# Patient Record
Sex: FEMALE | Race: OTHER | URBAN - METROPOLITAN AREA
[De-identification: names, ages, dates, MRNs, and addresses within clinical notes are randomized per-mention and may not be internally consistent; named-entity substitution may affect disease eponyms.]

---

## 2021-09-26 ENCOUNTER — INPATIENT (INPATIENT)
Facility: HOSPITAL | Age: 31
LOS: 1 days | Discharge: HOME | DRG: 103 | End: 2021-09-28
Attending: INTERNAL MEDICINE | Admitting: INTERNAL MEDICINE
Payer: MEDICAID

## 2021-09-26 VITALS
HEIGHT: 60.24 IN | HEART RATE: 75 BPM | TEMPERATURE: 98 F | WEIGHT: 130.95 LBS | SYSTOLIC BLOOD PRESSURE: 128 MMHG | DIASTOLIC BLOOD PRESSURE: 73 MMHG | OXYGEN SATURATION: 98 % | RESPIRATION RATE: 16 BRPM

## 2021-09-26 DIAGNOSIS — R47.1 DYSARTHRIA AND ANARTHRIA: ICD-10-CM

## 2021-09-26 LAB
ALBUMIN SERPL ELPH-MCNC: 4.3 G/DL — SIGNIFICANT CHANGE UP (ref 3.5–5)
ALP SERPL-CCNC: 61 U/L — SIGNIFICANT CHANGE UP (ref 40–120)
ALT FLD-CCNC: 50 U/L DA — SIGNIFICANT CHANGE UP (ref 10–60)
ANION GAP SERPL CALC-SCNC: 4 MMOL/L — LOW (ref 5–17)
APPEARANCE UR: CLEAR — SIGNIFICANT CHANGE UP
AST SERPL-CCNC: 23 U/L — SIGNIFICANT CHANGE UP (ref 10–40)
BACTERIA # UR AUTO: ABNORMAL /HPF
BASOPHILS # BLD AUTO: 0.04 K/UL — SIGNIFICANT CHANGE UP (ref 0–0.2)
BASOPHILS NFR BLD AUTO: 0.5 % — SIGNIFICANT CHANGE UP (ref 0–2)
BILIRUB SERPL-MCNC: 0.7 MG/DL — SIGNIFICANT CHANGE UP (ref 0.2–1.2)
BILIRUB UR-MCNC: NEGATIVE — SIGNIFICANT CHANGE UP
BUN SERPL-MCNC: 9 MG/DL — SIGNIFICANT CHANGE UP (ref 7–18)
CALCIUM SERPL-MCNC: 9 MG/DL — SIGNIFICANT CHANGE UP (ref 8.4–10.5)
CHLORIDE SERPL-SCNC: 110 MMOL/L — HIGH (ref 96–108)
CK SERPL-CCNC: 117 U/L — SIGNIFICANT CHANGE UP (ref 21–215)
CO2 SERPL-SCNC: 28 MMOL/L — SIGNIFICANT CHANGE UP (ref 22–31)
COLOR SPEC: YELLOW — SIGNIFICANT CHANGE UP
CREAT SERPL-MCNC: 0.63 MG/DL — SIGNIFICANT CHANGE UP (ref 0.5–1.3)
DIFF PNL FLD: NEGATIVE — SIGNIFICANT CHANGE UP
EOSINOPHIL # BLD AUTO: 0.13 K/UL — SIGNIFICANT CHANGE UP (ref 0–0.5)
EOSINOPHIL NFR BLD AUTO: 1.5 % — SIGNIFICANT CHANGE UP (ref 0–6)
EPI CELLS # UR: SIGNIFICANT CHANGE UP /HPF
ERYTHROCYTE [SEDIMENTATION RATE] IN BLOOD: 13 MM/HR — SIGNIFICANT CHANGE UP (ref 0–15)
GLUCOSE SERPL-MCNC: 84 MG/DL — SIGNIFICANT CHANGE UP (ref 70–99)
GLUCOSE UR QL: NEGATIVE — SIGNIFICANT CHANGE UP
HCG UR QL: NEGATIVE — SIGNIFICANT CHANGE UP
HCT VFR BLD CALC: 39.9 % — SIGNIFICANT CHANGE UP (ref 34.5–45)
HGB BLD-MCNC: 13.4 G/DL — SIGNIFICANT CHANGE UP (ref 11.5–15.5)
IMM GRANULOCYTES NFR BLD AUTO: 0.5 % — SIGNIFICANT CHANGE UP (ref 0–1.5)
KETONES UR-MCNC: NEGATIVE — SIGNIFICANT CHANGE UP
LEUKOCYTE ESTERASE UR-ACNC: NEGATIVE — SIGNIFICANT CHANGE UP
LYMPHOCYTES # BLD AUTO: 1.64 K/UL — SIGNIFICANT CHANGE UP (ref 1–3.3)
LYMPHOCYTES # BLD AUTO: 19 % — SIGNIFICANT CHANGE UP (ref 13–44)
MCHC RBC-ENTMCNC: 30.9 PG — SIGNIFICANT CHANGE UP (ref 27–34)
MCHC RBC-ENTMCNC: 33.6 GM/DL — SIGNIFICANT CHANGE UP (ref 32–36)
MCV RBC AUTO: 91.9 FL — SIGNIFICANT CHANGE UP (ref 80–100)
MONOCYTES # BLD AUTO: 0.36 K/UL — SIGNIFICANT CHANGE UP (ref 0–0.9)
MONOCYTES NFR BLD AUTO: 4.2 % — SIGNIFICANT CHANGE UP (ref 2–14)
NEUTROPHILS # BLD AUTO: 6.42 K/UL — SIGNIFICANT CHANGE UP (ref 1.8–7.4)
NEUTROPHILS NFR BLD AUTO: 74.3 % — SIGNIFICANT CHANGE UP (ref 43–77)
NITRITE UR-MCNC: NEGATIVE — SIGNIFICANT CHANGE UP
NRBC # BLD: 0 /100 WBCS — SIGNIFICANT CHANGE UP (ref 0–0)
PH UR: 6.5 — SIGNIFICANT CHANGE UP (ref 5–8)
PLATELET # BLD AUTO: 346 K/UL — SIGNIFICANT CHANGE UP (ref 150–400)
POTASSIUM SERPL-MCNC: 4 MMOL/L — SIGNIFICANT CHANGE UP (ref 3.5–5.3)
POTASSIUM SERPL-SCNC: 4 MMOL/L — SIGNIFICANT CHANGE UP (ref 3.5–5.3)
PROT SERPL-MCNC: 8.1 G/DL — SIGNIFICANT CHANGE UP (ref 6–8.3)
PROT UR-MCNC: NEGATIVE — SIGNIFICANT CHANGE UP
RBC # BLD: 4.34 M/UL — SIGNIFICANT CHANGE UP (ref 3.8–5.2)
RBC # FLD: 12.6 % — SIGNIFICANT CHANGE UP (ref 10.3–14.5)
RBC CASTS # UR COMP ASSIST: SIGNIFICANT CHANGE UP /HPF (ref 0–2)
SODIUM SERPL-SCNC: 142 MMOL/L — SIGNIFICANT CHANGE UP (ref 135–145)
SP GR SPEC: 1.01 — SIGNIFICANT CHANGE UP (ref 1.01–1.02)
UROBILINOGEN FLD QL: NEGATIVE — SIGNIFICANT CHANGE UP
WBC # BLD: 8.63 K/UL — SIGNIFICANT CHANGE UP (ref 3.8–10.5)
WBC # FLD AUTO: 8.63 K/UL — SIGNIFICANT CHANGE UP (ref 3.8–10.5)
WBC UR QL: SIGNIFICANT CHANGE UP /HPF (ref 0–5)

## 2021-09-26 PROCEDURE — 99222 1ST HOSP IP/OBS MODERATE 55: CPT

## 2021-09-26 PROCEDURE — 99285 EMERGENCY DEPT VISIT HI MDM: CPT

## 2021-09-26 PROCEDURE — 70470 CT HEAD/BRAIN W/O & W/DYE: CPT | Mod: 26,MA

## 2021-09-26 PROCEDURE — 71045 X-RAY EXAM CHEST 1 VIEW: CPT | Mod: 26

## 2021-09-26 RX ORDER — METOCLOPRAMIDE HCL 10 MG
10 TABLET ORAL ONCE
Refills: 0 | Status: COMPLETED | OUTPATIENT
Start: 2021-09-26 | End: 2021-09-26

## 2021-09-26 RX ADMIN — Medication 10 MILLIGRAM(S): at 18:05

## 2021-09-26 NOTE — ED PROVIDER NOTE - ATTENDING CONTRIBUTION TO CARE
pt with intermittent episodes of dysrthria , difficulty swallowing   headache improvement with reglan    pt is unable to speak clearly    possible migraine complex

## 2021-09-26 NOTE — H&P ADULT - HISTORY OF PRESENT ILLNESS
Patient is 31 year old female from home, no significant Past history presented due to severe headache. For the last 2 years she has been getting intermittent headaches to the top of the head that are associated with difficulty with speech and swallowing. Feels like foods is getting stuck and when eats with water it is better. The episodes last for ~ 1 month and then resolve on their own. Associated with slight numbness of the tongue. she had a workup in [place] and was told there is an "issue with her brain but nothing can be done". Denies any vision changes, weakness of the eyelids, worsening of symptoms towards the end of the day, fever, chills, photo/phonophobia, focal weakness, numbness/tingling anywhere else, incontinence, gait problem or any other complaints.

## 2021-09-26 NOTE — H&P ADULT - ASSESSMENT
Patient is 31 year old female from home, no significant Past history presented due to severe headache. For the last 2 years she has been getting intermittent headaches to the top of the head that are associated with difficulty with speech and swallowing. Patient will be admitted for further workup.

## 2021-09-26 NOTE — ED PROVIDER NOTE - PHYSICAL EXAMINATION
Face symmetrical during smiling although when talking seems to have a R sided drop. Able to puff cheeks and hold against pressure. +Dysarthria. Raises eyebrows symmetrical. No sensory deficits. Moves tongue in all directions with ease.  No pronator drift. All limbs equally strong bilaterally. No facial/scalp rash. no temporal tenderness to palpation.  Pupils 5 mm with PERRL and EOMI.  Uvula rises symmetrically. No uvular or tonsillar swelling/erythema or exudate.

## 2021-09-26 NOTE — ED ADULT NURSE NOTE - OBJECTIVE STATEMENT
As per pt, c/o intermittent h/a at the top of the head w/ difficulty w/ slight numbness of the tongue and speech and swallowing and pt admits she feels like foods is getting stuck in her throat x1 day. Pt admit the compliant happens for a month then resolves on its own over time. Pt denies all other symptoms.

## 2021-09-26 NOTE — ED PROVIDER NOTE - OBJECTIVE STATEMENT
31 year-old female, no significant PMHx, presents with Translated from [place] by Dr Vergara: 31 year-old female, no significant PMHx, presents with multiple medical complaints. For the last 2 years she has been getting intermittent headaches to the top of the head that are associated with difficulty with speech and swallowing. Feels like foods is getting stuck and when eats with water it is better. The episodes last for ~ 1 month and then resolve on their own. Associated with slight numbness of the tongue. Had a workup in [place] and was told there is an "issue with her brain but nothing can be done". Denies any vision changes, weakness of the eyelids, worsening of symptoms towards the end of the day, fever, chills, photo/phonophobia, focal weakness, numbness/tingling anywhere else, incontinence, gait problem or any other complaints. This episode started yesterday.

## 2021-09-26 NOTE — H&P ADULT - ATTENDING COMMENTS
Patient is a 31 year old female with no reported PMH who presented with a chief complaint of headache.  ( ID: [id number])  Patient states that beginning approximately 2 years prior to current presentation, she began to experience intermittent bi-frontal headache radiating to the calvarium.  Patient states that she awoke 3 days ago with a "bad" headache associated with difficulty "saying my words" as well as difficulty swallowing, predominantly solid foods (such as meat).  Patient endorses that these constellation of symptoms last for approximately 1 month and spontaneously resolves independent of any intervention.    Of note, patient states that approximately 1 year prior to current presentation, she was seen by a Neurologist in [place] and after testing, was informed that although she has a problem with her brain she was never provided a diagnosis and was told to ignore it.    Additionally of note, during initial triage in ED, both ED Attending (Dr. Vergara) and PA (Missael Mccullough) separately concured that patient's speech subjectively demonstrated a modicum of improvement since initial interview.    At time of examination in the ED, patient denies any dizziness, sensory changes, visual changes/diplopia, amulatory dysfunction, "electric"-like sensations, urinary dysfunction, pain/weakness/fatigue.     T(C): 36.6 (09-26-21 @ 19:36), Max: 36.9 (09-26-21 @ 15:17)  T(F): 97.8 (09-26-21 @ 19:36), Max: 98.4 (09-26-21 @ 15:17)  HR: 61 (09-26-21 @ 19:36) (61 - 75)  BP: 108/67 (09-26-21 @ 19:36) (108/67 - 128/73)  RR: 16 (09-26-21 @ 19:36) (16 - 16)  SpO2: 100% (09-26-21 @ 19:36) (98% - 100%)  Wt(kg): --    P/E: As above MAR    A/P:    Headache, Dysphasia and Dysphagia possibly due to Status Migrainosis/Complex Migraine vs Relapsing Remitting MS vs unlikely MG:  -CT Head identified an unremarkable pre and post contrast head CT  -[place] order CXR NOW  -Will send acetylcholine receptor autoantibodies, muscle-specific tyrosine kinase autoantibodies, low-density lipoprotein receptor-related protein 4 autoantibodies, GERDA, dsDNA  -Will send Hemoglobin A1c and Lipid Panel  -Will initiate patient on IVF  -Will order Toradol 30mg Once  -Will order Reglan 10mg IV Q8H JEAN MARIE  -Will need to obtain MRI Brain  -Will certainly need to ask Neurology to evaluate patient for further recommendations  -Fall Precautions, Aspiration Precautions    GI/DVT PPx:  -Intermittent Compression Stockings  -Pepcid Patient is a 31 year old female with no reported PMH who presented with a chief complaint of headache.  ( ID: [id number])  Patient states that beginning approximately 2 years prior to current presentation, she began to experience intermittent bi-frontal headache radiating to the calvarium.  Patient states that she awoke 3 days ago with a "bad" headache associated with difficulty "saying my words" as well as difficulty swallowing, predominantly solid foods (such as meat).  Patient endorses that these constellation of symptoms last for approximately 1 month and spontaneously resolves independent of any intervention.    Of note, patient states that approximately 1 year prior to current presentation, she was seen by a Neurologist in [place] and after testing was informed that although she has a problem with her brain she was never provided a diagnosis and was told to ignore it.    Additionally of note, during initial triage in ED, both ED Attending (Dr. Vergara) and PA (Missael Mccullough) separately concurred that patient's speech subjectively demonstrated a modicum of improvement since initial interview.    At time of examination in the ED, patient denies any dizziness, sensory changes, visual changes/diplopia, ambulatory dysfunction, "electric"-like sensations, urinary dysfunction, pain/weakness/fatigue.     T(C): 36.6 (09-26-21 @ 19:36), Max: 36.9 (09-26-21 @ 15:17)  T(F): 97.8 (09-26-21 @ 19:36), Max: 98.4 (09-26-21 @ 15:17)  HR: 61 (09-26-21 @ 19:36) (61 - 75)  BP: 108/67 (09-26-21 @ 19:36) (108/67 - 128/73)  RR: 16 (09-26-21 @ 19:36) (16 - 16)  SpO2: 100% (09-26-21 @ 19:36) (98% - 100%)  Wt(kg): --    P/E: As above MAR    A/P:    Headache, Dysphasia and Dysphagia possibly due to Status Migrainosis/Complex Migraine vs Relapsing Remitting MS vs unlikely MG:  -CT Head identified an unremarkable pre and post contrast head CT  -[place] order CXR NOW  -Will send acetylcholine receptor autoantibodies, muscle-specific tyrosine kinase autoantibodies, low-density lipoprotein receptor-related protein 4 autoantibodies, GERDA, dsDNA  -Will send Hemoglobin A1c and Lipid Panel  -Will initiate patient on IVF  -Will order Toradol 30mg Once  -Will order Reglan 10mg IV Q8H JEAN MARIE  -Will need to obtain MRI Brain  -Will certainly need to ask Neurology to evaluate patient for further recommendations  -Fall Precautions, Aspiration Precautions    GI/DVT PPx:  -Intermittent Compression Stockings  -Pepcid Patient is a 31 year old female with no reported PMH who presented with a chief complaint of headache.  ( ID: [id number])  Patient states that beginning approximately 2 years prior to current presentation, she began to experience intermittent bi-frontal headache radiating to the calvarium.  Patient states that she awoke 3 days ago with a "bad" headache associated with difficulty "saying my words" as well as difficulty swallowing, predominantly solid foods (such as meat).  Patient endorses that these constellation of symptoms last for approximately 1 month and spontaneously resolves independent of any intervention.    Of note, patient states that approximately 1 year prior to current presentation, she was seen by a Neurologist in [place] and after testing was informed that although she has a problem with her brain she was never provided a diagnosis and was told to ignore it.    Additionally of note, during initial triage in ED, both ED Attending (Dr. Vergara) and PA (Missael Mccullough) separately concurred that patient's speech subjectively demonstrated a modicum of improvement since initial interview.    At time of examination in the ED, patient denies any fever/chills, dizziness, sensory changes, visual changes/diplopia, ambulatory dysfunction, "electric"-like sensations, myalgias/arthralgias, urinary dysfunction, pain/weakness/fatigue.     T(C): 36.6 (09-26-21 @ 19:36), Max: 36.9 (09-26-21 @ 15:17)  T(F): 97.8 (09-26-21 @ 19:36), Max: 98.4 (09-26-21 @ 15:17)  HR: 61 (09-26-21 @ 19:36) (61 - 75)  BP: 108/67 (09-26-21 @ 19:36) (108/67 - 128/73)  RR: 16 (09-26-21 @ 19:36) (16 - 16)  SpO2: 100% (09-26-21 @ 19:36) (98% - 100%)  Wt(kg): --    P/E: As above MAR    A/P:    Headache, Dysphasia and Dysphagia possibly due to Status Migrainosis/Complex Migraine vs Relapsing Remitting MS vs unlikely MG:  -CT Head identified an unremarkable pre and post contrast head CT  -[place] order CXR NOW  -Will send acetylcholine receptor autoantibodies, muscle-specific tyrosine kinase autoantibodies, low-density lipoprotein receptor-related protein 4 autoantibodies, GERDA, dsDNA  -Will send Hemoglobin A1c and Lipid Panel  -Will initiate patient on IVF  -Will order Toradol 30mg Once  -Will order Reglan 10mg IV Q8H JEAN MARIE  -Will need to obtain MRI Brain  -Will certainly need to ask Neurology to evaluate patient for further recommendations  -Fall Precautions, Aspiration Precautions    GI/DVT PPx:  -Intermittent Compression Stockings  -Pepcid Patient is a 31 year old female with no reported PMH who presented with a chief complaint of headache.  ( ID: [id number])  Patient states that beginning approximately 2 years prior to current presentation, she began to experience intermittent bi-frontal headache radiating to the calvarium.  Patient states that she awoke 3 days ago with a "bad" headache associated with difficulty "saying my words" as well as difficulty swallowing, predominantly solid foods (such as meat).  Patient endorses that these constellation of symptoms last for approximately 1 month and spontaneously resolves independent of any intervention.    Of note, patient states that approximately 1 year prior to current presentation, she was seen by a Neurologist in [place] and after testing was informed that although she has a problem with her brain she was never provided a diagnosis and was told to ignore it.    Additionally of note, during initial triage in ED, both ED Attending (Dr. Vergara) and PA (Missael Mccullough) separately concurred that patient's speech subjectively demonstrated a modicum of improvement since initial interview.    At time of examination in the ED, patient denies any fever/chills, dizziness, sensory changes, visual changes/diplopia, ambulatory dysfunction, "electric"-like sensations, myalgias/arthralgias, urinary dysfunction, weight gain/loss, pain/weakness/fatigue.     T(C): 36.6 (09-26-21 @ 19:36), Max: 36.9 (09-26-21 @ 15:17)  T(F): 97.8 (09-26-21 @ 19:36), Max: 98.4 (09-26-21 @ 15:17)  HR: 61 (09-26-21 @ 19:36) (61 - 75)  BP: 108/67 (09-26-21 @ 19:36) (108/67 - 128/73)  RR: 16 (09-26-21 @ 19:36) (16 - 16)  SpO2: 100% (09-26-21 @ 19:36) (98% - 100%)  Wt(kg): --    P/E: As above MAR    A/P:    Headache, Dysphasia and Dysphagia possibly due to Status Migrainosis/Complex Migraine vs Relapsing Remitting MS vs unlikely MG:  -CT Head identified an unremarkable pre and post contrast head CT  -[place] order CXR NOW  -Will send acetylcholine receptor autoantibodies, muscle-specific tyrosine kinase autoantibodies, low-density lipoprotein receptor-related protein 4 autoantibodies, GERDA, dsDNA  -Will send Hemoglobin A1c and Lipid Panel  -Will initiate patient on IVF  -Will order Toradol 30mg Once  -Will order Reglan 10mg IV Q8H JEAN MARIE  -Will need to obtain MRI Brain  -Will certainly need to ask Neurology to evaluate patient for further recommendations  -Fall Precautions, Aspiration Precautions    GI/DVT PPx:  -Intermittent Compression Stockings  -Pepcid Patient is a 31 year old female with no reported PMH who presented with a chief complaint of headache.  ( ID: [id number])  Patient states that beginning approximately 2 years prior to current presentation, she began to experience intermittent bi-frontal headache radiating to the calvarium.  Patient states that she awoke 3 days ago with a "bad" headache associated with difficulty "saying my words" as well as difficulty swallowing, predominantly solid foods (such as meat).  Patient endorses that these constellation of symptoms last for approximately 1 month and spontaneously resolves independent of any intervention.    Of note, patient states that approximately 1 year prior to current presentation, she was seen by a Neurologist in [place] and after testing was informed that although she has a problem with her brain she was never provided a diagnosis and was told to ignore it.    Additionally of note, during initial triage in ED, both ED Attending (Dr. Vergara) and PA (Missael Mccullough) separately concurred that patient's speech subjectively demonstrated a modicum of improvement since initial interview.    At time of examination in the ED, patient denies any fever/chills, dizziness, sensory changes, visual changes/diplopia, ambulatory dysfunction, "electric"-like sensations, myalgias/arthralgias, urinary dysfunction, weight gain/loss, pain/weakness/fatigue.     T(C): 36.6 (09-26-21 @ 19:36), Max: 36.9 (09-26-21 @ 15:17)  T(F): 97.8 (09-26-21 @ 19:36), Max: 98.4 (09-26-21 @ 15:17)  HR: 61 (09-26-21 @ 19:36) (61 - 75)  BP: 108/67 (09-26-21 @ 19:36) (108/67 - 128/73)  RR: 16 (09-26-21 @ 19:36) (16 - 16)  SpO2: 100% (09-26-21 @ 19:36) (98% - 100%)  Wt(kg): --    P/E: As above MAR    A/P:    Headache, Dysphasia and Dysphagia possibly due to Status Migrainosis/Complex Migraine vs Relapsing Remitting MS vs unlikely MG:  -CT Head identified an unremarkable pre and post contrast head CT  -[place] order CXR NOW  -Will send acetylcholine receptor autoantibodies, muscle-specific tyrosine kinase autoantibodies, low-density lipoprotein receptor-related protein 4 autoantibodies, GERDA, dsDNA  -Will send Hemoglobin A1c and Lipid Panel  -Will initiate patient on IVF  -Will order Toradol 30mg Once  -Will order Reglan 10mg IV Q8H JEAN MARIE  -TTE with bubbles  -Will need to obtain MRI Brain  -Will certainly need to ask Neurology to evaluate patient for further recommendations  -Fall Precautions, Aspiration Precautions    GI/DVT PPx:  -Intermittent Compression Stockings  -Pepcid

## 2021-09-26 NOTE — H&P ADULT - NSHPPHYSICALEXAM_GEN_ALL_CORE
ICU Vital Signs Last 24 Hrs  T(C): 36.7 (26 Sep 2021 23:49), Max: 36.9 (26 Sep 2021 15:17)  T(F): 98 (26 Sep 2021 23:49), Max: 98.4 (26 Sep 2021 15:17)  HR: 62 (26 Sep 2021 23:49) (61 - 75)  BP: 114/55 (26 Sep 2021 23:49) (108/67 - 128/73)  BP(mean): --  ABP: --  ABP(mean): --  RR: 18 (26 Sep 2021 23:49) (16 - 18)  SpO2: 98% (26 Sep 2021 23:49) (98% - 100%)    GENERAL: NAD, lying in bed comfortably  HEAD:  Atraumatic, Normocephalic  EYES: EOMI, PERRLA, conjunctiva and sclera clear  ENT: Moist mucous membranes  NECK: Supple, No JVD  CHEST/LUNG: Clear to auscultation bilaterally; No rales, rhonchi, wheezing, or rubs. Unlabored respirations  HEART: Regular rate and rhythm; No murmurs, rubs, or gallops  ABDOMEN: Bowel sounds present; Soft, Nontender, Nondistended. No hepatomegally  EXTREMITIES:  2+ Peripheral Pulses, brisk capillary refill. No clubbing, cyanosis, or edema  NERVOUS SYSTEM:  Alert & Oriented X3, speech clear. No deficits at time of examination  MSK: FROM all 4 extremities, full and equal strength  SKIN: No rashes or lesions

## 2021-09-26 NOTE — H&P ADULT - PROBLEM SELECTOR PLAN 1
- Patient presented with severe headache to the top of the head that are associated with difficulty with speech and   swallowing.  - Could be Multiple sclerosis vs complex migraine vs lupus vs( less likely Myasthenia gravis ).  - CT head with IV contrast was negative for any abnormalities   - Patient got 10mg IV Reglan in ED   - Patient will need MRI and neurology consult   - Will send GERDA, DS DNA , acetylcholine receptor autoantibodies, muscle-specific tyrosine kinase autoantibodies  -Will order Reglan 10mg IV Q8H JEAN MARIE  -TTE with bubbles to r/o embolic strokes ( PFO  )  - f/u Neurology rec's - Patient presented with severe headache to the top of the head that are associated with difficulty with speech and   swallowing.  - Could be Multiple sclerosis vs complex migraine vs lupus vs( less likely Myasthenia gravis ).  - CT head with IV contrast was negative for any abnormalities   - Patient got 10mg IV Reglan in ED   - Patient will need MRI and neurology consult Dr. Mckeon   - Will send GERDA, DS DNA , acetylcholine receptor autoantibodies, muscle-specific tyrosine kinase autoantibodies  -Will order Reglan 10mg IV Q8H JEAN MARIE  -TTE with bubbles to r/o embolic strokes ( PFO  )  - f/u Neurology rec's

## 2021-09-27 DIAGNOSIS — R51.9 HEADACHE, UNSPECIFIED: ICD-10-CM

## 2021-09-27 DIAGNOSIS — Z29.9 ENCOUNTER FOR PROPHYLACTIC MEASURES, UNSPECIFIED: ICD-10-CM

## 2021-09-27 DIAGNOSIS — R13.10 DYSPHAGIA, UNSPECIFIED: ICD-10-CM

## 2021-09-27 LAB
COVID-19 SPIKE DOMAIN AB INTERP: POSITIVE
COVID-19 SPIKE DOMAIN ANTIBODY RESULT: >250 U/ML — HIGH
SARS-COV-2 IGG+IGM SERPL QL IA: >250 U/ML — HIGH
SARS-COV-2 IGG+IGM SERPL QL IA: POSITIVE
SARS-COV-2 RNA SPEC QL NAA+PROBE: SIGNIFICANT CHANGE UP

## 2021-09-27 PROCEDURE — 12345: CPT | Mod: NC

## 2021-09-27 PROCEDURE — 99255 IP/OBS CONSLTJ NEW/EST HI 80: CPT

## 2021-09-27 RX ORDER — ACETAMINOPHEN 500 MG
650 TABLET ORAL EVERY 6 HOURS
Refills: 0 | Status: DISCONTINUED | OUTPATIENT
Start: 2021-09-27 | End: 2021-09-28

## 2021-09-27 RX ORDER — HEPARIN SODIUM 5000 [USP'U]/ML
5000 INJECTION INTRAVENOUS; SUBCUTANEOUS EVERY 8 HOURS
Refills: 0 | Status: DISCONTINUED | OUTPATIENT
Start: 2021-09-27 | End: 2021-09-28

## 2021-09-27 RX ORDER — ACETAMINOPHEN 500 MG
1 TABLET ORAL
Qty: 0 | Refills: 0 | DISCHARGE

## 2021-09-27 RX ORDER — KETOROLAC TROMETHAMINE 30 MG/ML
30 SYRINGE (ML) INJECTION ONCE
Refills: 0 | Status: DISCONTINUED | OUTPATIENT
Start: 2021-09-27 | End: 2021-09-27

## 2021-09-27 RX ORDER — METOCLOPRAMIDE HCL 10 MG
10 TABLET ORAL EVERY 8 HOURS
Refills: 0 | Status: COMPLETED | OUTPATIENT
Start: 2021-09-27 | End: 2021-09-28

## 2021-09-27 RX ORDER — ACETAMINOPHEN 500 MG
650 TABLET ORAL EVERY 6 HOURS
Refills: 0 | Status: DISCONTINUED | OUTPATIENT
Start: 2021-09-27 | End: 2021-09-27

## 2021-09-27 RX ADMIN — Medication 30 MILLIGRAM(S): at 09:14

## 2021-09-27 RX ADMIN — Medication 10 MILLIGRAM(S): at 22:54

## 2021-09-27 RX ADMIN — Medication 10 MILLIGRAM(S): at 14:06

## 2021-09-27 RX ADMIN — Medication 650 MILLIGRAM(S): at 21:07

## 2021-09-27 RX ADMIN — Medication 30 MILLIGRAM(S): at 08:07

## 2021-09-27 RX ADMIN — Medication 650 MILLIGRAM(S): at 21:37

## 2021-09-27 RX ADMIN — HEPARIN SODIUM 5000 UNIT(S): 5000 INJECTION INTRAVENOUS; SUBCUTANEOUS at 14:06

## 2021-09-27 RX ADMIN — HEPARIN SODIUM 5000 UNIT(S): 5000 INJECTION INTRAVENOUS; SUBCUTANEOUS at 22:53

## 2021-09-27 NOTE — SWALLOW BEDSIDE ASSESSMENT ADULT - SWALLOW EVAL: DIAGNOSIS
Pt p/w adequate oral & pharyngeal phases of swallow e/b good labial seal, functional bolus manipulation/propulsion, & timely pharyngeal swallow. Pt reported solid PO gets stuck in her throat and she requires prolonged mastication to sufficiently pass bolus. SLP educated pt on swallowing strategies (i.e., chin tuck). No overt s/s of laryngeal penetration or aspiration at this exam.

## 2021-09-27 NOTE — CONSULT NOTE ADULT - ASSESSMENT
30yo female w/    Recommendations:     30yo female w/ dysarthria     Recommendations:    - Consider giving the following ALL together for HA: Benadryl 25mg IV q8h, Reglan 10mg IV q8h, & Toradol 30mg IV q8h may repeat (Maximum x 3 doses)    - D/t claustrophobia consider outpt MR Brain     - EEG to evaluate for abnormality    - Speech and swallow to evaluate dysphagia     - PT as tolerated     32yo female w/ dysarthria     Recommendations:    - Consider giving the following ALL together for HA: Benadryl 25mg IV q8h, Reglan 10mg IV q8h, & Toradol 30mg IV q8h may repeat (Maximum x 3 doses)    - D/t claustrophobia consider outpt Open MR Brain     - EEG to evaluate for abnormality    - Speech and swallow to evaluate dysphagia     - PT as tolerated

## 2021-09-27 NOTE — CONSULT NOTE ADULT - ATTENDING COMMENTS
[facility]  Spoke to patient   [id number]  The patient is a 31 year old woman with few years of recurrent severe headaches with associated dysarthria and dysphagia present during the headache episodes.  The headaches are not associated with other focal neurological symptoms or migranous features.  The symptoms are present only during headache episodes.  The patient presented to the hospital now as she now has insurance.  Neurological exam is notable for dysarthia and slow saccates on right horizontal gaze without diplopia or nystagmus, EOMI and KENNY.  NIHSS is 1 and MRS 0.  CTH is unremarkable.  The patient's presentation is concerning for complicated migraine phenomenon, should also consider posterior circulation involvement (kye and medullary) due to mass, chronic ischemia and given patient's age should also consider multiple sclerosis; unusual epileptic activity also possible but less likely.  The patient needs MRI brain with and w/o kalpana to eval for lesions described above but she is claustrophobic and unable to tolerate conventional MRI.  Her symptoms are chronic and it is reasonable to perform this test as outpatient, open MRI brain with and w/o kalpana.  Will recommend EEG as epileptiform activity are more likely to be picked up in acute setting as well as speech and swallow eval due to the patient's dysarthria and dysphagia.  The patient can received NSAIDS for headache and if no response consider Reglan, Benadryl and Toradol 30mg IV [id number] prn max 3 doses for headache if it reoccurs.  christina NP and Dr. Henderson

## 2021-09-27 NOTE — CHART NOTE - NSCHARTNOTEFT_GEN_A_CORE
Event:  "HA"     Brief HPI: As per chart review and pt, pt is 31 year old female Sami speaker, from home, no significant Past history presented due to severe headache. For the last 2 years she has been getting intermittent headaches to the top of the head that are associated with difficulty with speech and swallowing.  Denies any vision changes, weakness of the eyelids, worsening of symptoms towards the end of the day, fever, chills, photo/phonophobia, focal weakness, numbness/tingling, incontinence.  Pt reported prior work up in [place] for same symptoms but not able to recall if she was formally diagnosed w/ any medical condition.    Of note, pt reported improvement in her symptoms (headache).      Vital Signs Last 24 Hrs  T(C): 36.9 (27 Sep 2021 16:01), Max: 36.9 (27 Sep 2021 11:15)  T(F): 98.5 (27 Sep 2021 16:01), Max: 98.5 (27 Sep 2021 11:15)  HR: 67 (27 Sep 2021 16:01) (61 - 76)  BP: 109/67 (27 Sep 2021 16:01) (106/65 - 114/55)  BP(mean): --  RR: 18 (27 Sep 2021 16:01) (16 - 18)  SpO2: 95% (27 Sep 2021 16:01) (95% - 100%)    Constitutional: in bed resting comfortably w/o signs of distress, reports improvement in her symptoms  Neuro- alert and oriented x 3, mild dysarthria, CNII-VII grossly intact, extremities w/ equal strength at time of eval   Head: NCAT  Eyes- EOMI, PERRLA, clear conjunctiva and sclera, MMM  ENT-nml pharynx w/o hypertrophy, erythema or exudates  Neck- supple, NT, no cervical LAD  CV-RRR, nml S1S2, no mmr, rubs or gallops  Resp- BL-CTA w/o increased WOB  GI- Abd sft, nt, nd, nr or guarding, no pulsatile mass+ BS  Extremities- all extremities w/ FROM, + distal pulses    Labs:                       13.4   8.63  )-----------( 346      ( 26 Sep 2021 17:45 )             39.9   09-26    142  |  110<H>  |  9   ----------------------------<  84  4.0   |  28  |  0.63    Ca    9.0      26 Sep 2021 17:45    TPro  8.1  /  Alb  4.3  /  TBili  0.7  /  DBili  x   /  AST  23  /  ALT  50  /  AlkPhos  61  09-26      A/P:  32 Y/O F Sami speaker, from home, no sign pmhx presented w/severe headache admitted for symptoms management and neuro eval.  CTH neg for acute finding.   Plan:  1. Neuro consulted- will need outpt MRI as pt is claustrophobic and wants open MRI            2. EEG           3. Symptoms management w/ Toradol/ Reglan/ Benadryl PRN            4. S/S eval           5. Anticipating early D/C tomorrow post EEG and to follow up with Dr. Mckeon Event:  "HA"     Brief HPI: As per chart review and pt, pt is 31 year old female Maori speaker, from home, no significant Past history presented due to severe headache. For the last 2 years she has been getting intermittent headaches to the top of the head that are associated with difficulty with speech and swallowing.  Denies any vision changes, weakness of the eyelids, worsening of symptoms towards the end of the day, fever, chills, photo/phonophobia, focal weakness, numbness/tingling, incontinence.  Pt reported prior work up in [facility] for same symptoms but not able to recall if she was formally diagnosed w/ any medical condition.    Of note, pt reported improvement in her symptoms (headache).      Vital Signs Last 24 Hrs  T(C): 36.9 (27 Sep 2021 16:01), Max: 36.9 (27 Sep 2021 11:15)  T(F): 98.5 (27 Sep 2021 16:01), Max: 98.5 (27 Sep 2021 11:15)  HR: 67 (27 Sep 2021 16:01) (61 - 76)  BP: 109/67 (27 Sep 2021 16:01) (106/65 - 114/55)  BP(mean): --  RR: 18 (27 Sep 2021 16:01) (16 - 18)  SpO2: 95% (27 Sep 2021 16:01) (95% - 100%)    Constitutional: in bed resting comfortably w/o signs of distress, reports improvement in her symptoms  Neuro- alert and oriented x 3, mild dysarthria, CNII-VII grossly intact, extremities w/ equal strength at time of eval   Head: NCAT  Eyes- EOMI, PERRLA, clear conjunctiva and sclera, MMM  ENT-nml pharynx w/o hypertrophy, erythema or exudates  Neck- supple, NT, no cervical LAD  CV-RRR, nml S1S2, no mmr, rubs or gallops  Resp- BL-CTA w/o increased WOB  GI- Abd sft, nt, nd, nr or guarding, no pulsatile mass+ BS  Extremities- all extremities w/ FROM, + distal pulses    Labs:                       13.4   8.63  )-----------( 346      ( 26 Sep 2021 17:45 )             39.9   09-26    142  |  110<H>  |  9   ----------------------------<  84  4.0   |  28  |  0.63    Ca    9.0      26 Sep 2021 17:45    TPro  8.1  /  Alb  4.3  /  TBili  0.7  /  DBili  x   /  AST  23  /  ALT  50  /  AlkPhos  61  09-26      A/P:  32 Y/O F Maori speaker, from home, no sign pmhx presented w/severe headache admitted for symptoms management and neuro eval.  CTH neg for acute finding.   Plan:  1. Neuro consulted- will need outpt MRI as pt is claustrophobic and wants open MRI            2. EEG           3. Symptoms management w/ Toradol/ Reglan/ Benadryl PRN            4. S/S eval           5. Anticipating early D/C tomorrow post EEG and to follow up with Dr. Mckeon

## 2021-09-27 NOTE — SWALLOW BEDSIDE ASSESSMENT ADULT - COMMENTS
Pt elevated to 90° by SLP. AA+O x3. (+) suspected velar insufficiency. Pt expressed feeling like she was going to throw upon  presentation of PO Chin tuck was suggested as strategy for pt

## 2021-09-27 NOTE — SWALLOW BEDSIDE ASSESSMENT ADULT - CONSISTENCIES ADMINISTERED
ice chips x1 Applesauce x 3 teaspoons/puree Applesauce w/ juan miguel cracker x 4 teaspoons/mech soft x3 bites/solid x3 sips/nectar thick water x3 sips/thin liquid

## 2021-09-27 NOTE — CONSULT NOTE ADULT - SUBJECTIVE AND OBJECTIVE BOX
++++++++++++++++NOTE NOT COMPLETED++++++++++++++++++++++++++++++    Patient is a 31y old  Female who presents with a chief complaint of Severe headache (26 Sep 2021 21:39)      HPI:  Patient is 31 year old female from home, no significant Past history presented due to severe headache. For the last 2 years she has been getting intermittent headaches to the top of the head that are associated with difficulty with speech and swallowing. Feels like foods is getting stuck and when eats with water it is better. The episodes last for ~ 1 month and then resolve on their own. Associated with slight numbness of the tongue. she had a workup in [place] and was told there is an "issue with her brain but nothing can be done". Denies any vision changes, weakness of the eyelids, worsening of symptoms towards the end of the day, fever, chills, photo/phonophobia, focal weakness, numbness/tingling anywhere else, incontinence, gait problem or any other complaints. (26 Sep 2021 21:39)         Neurological Review of Systems:  No difficulty with language.  No vision loss or double vision.  No dizziness, vertigo or new hearing loss.  No difficulty with speech or swallowing.  No focal weakness.  No focal sensory changes.  No numbness or tingling in the bilateral lower extremities.  No difficulty with balance.  No difficulty with ambulation.        MEDICATIONS  (STANDING):  heparin   Injectable 5000 Unit(s) SubCutaneous every 8 hours  metoclopramide Injectable 10 milliGRAM(s) IV Push every 8 hours    MEDICATIONS  (PRN):  acetaminophen   Tablet .. 650 milliGRAM(s) Oral every 6 hours PRN Mild Pain (1 - 3)    Allergies    No Known Allergies    Intolerances      PAST MEDICAL & SURGICAL HISTORY:    FAMILY HISTORY:    SOCIAL HISTORY: non smoker    Review of Systems:  Constitutional: No generalized weakness. No fevers or chills                   Eyes, Ears, Mouth, Throat: No vision loss   Respiratory: No shortness of breath or cough                               Cardiovascular: No chest pain or palpitations  Gastrointestinal: (+) Nausea & vomiting                                      Genitourinary: No urinary incontinence or burning on urination  Musculoskeletal: No joint pain                                                         Dermatologic: No rash  Neurological: as per HPI                                                                      Psychiatric: No behavioral problems  Endocrine: No known hypoglycemia              Hematologic/Lymphatic: No easy bleeding    O:  Vital Signs Last 24 Hrs  T(C): 36.8 (27 Sep 2021 07:15), Max: 36.9 (26 Sep 2021 15:17)  T(F): 98.3 (27 Sep 2021 07:15), Max: 98.4 (26 Sep 2021 15:17)  HR: 74 (27 Sep 2021 07:15) (61 - 75)  BP: 107/68 (27 Sep 2021 07:15) (106/65 - 128/73)  RR: 18 (27 Sep 2021 07:15) (16 - 18)  SpO2: 99% (27 Sep 2021 07:15) (98% - 100%)    General Exam:   General appearance: No acute distress                 Cardiovascular: Pedal dorsalis pulses intact bilaterally    Mental Status: Oriented to self, date and place.  Attention intact.  No dysarthria, aphasia or neglect.  Knowledge intact.  Registration intact.  Short and long term memory grossly intact.      Cranial Nerves: CN I - not tested.  PERRL, EOMI, VFF, no nystagmus or diplopia.  No APD.  Fundi not visualized.  CN V1-3 intact to light touch.  No facial asymmetry.  Hearing intact to finger rub bilaterally.  Tongue, uvula and palate midline.  Sternocleidomastoid and Trapezius intact bilaterally.    Motor:   Tone: Normal                  Strength intact throughout  No pronator drift bilaterally                      No dysmetria on finger-nose-finger or heel-shin-heel  No truncal ataxia.  No resting, postural or action tremor.  No myoclonus.    Sensation: Intact to light touch    Deep Tendon Reflexes: 2+ bilateral biceps, triceps, brachioradialis, knee.  Mute ankles.    Toes     Gait: Normal and stable.  Romberg (-).    Other:     LABS:                        13.4   8.63  )-----------( 346      ( 26 Sep 2021 17:45 )             39.9     09-    142  |  110<H>  |  9   ----------------------------<  84  4.0   |  28  |  0.63    Ca    9.0      26 Sep 2021 17:45    TPro  8.1  /  Alb  4.3  /  TBili  0.7  /  DBili  x   /  AST  23  /  ALT  50  /  AlkPhos  61        Urinalysis Basic - ( 26 Sep 2021 17:46 )    Color: Yellow / Appearance: Clear / S.010 / pH: x  Gluc: x / Ketone: Negative  / Bili: Negative / Urobili: Negative   Blood: x / Protein: Negative / Nitrite: Negative   Leuk Esterase: Negative / RBC: 0-2 /HPF / WBC 0-2 /HPF   Sq Epi: x / Non Sq Epi: Few /HPF / Bacteria: Trace /HPF          RADIOLOGY & ADDITIONAL STUDIES:  < from: CT Head w/wo IV Cont (21 @ 19:07) >  EXAM:  CT BRAIN WAW IC                            PROCEDURE DATE:  2021          INTERPRETATION:  Clinical indications: Headache and vomiting    Technique:  Multiple axial sections were acquired from the base of the skull to the vertex beforeand after administration of approximately 90 cc of Omnipaque 350 IV. 10 cc of contrast was sclerotic. Coronal and sagittal reconstructed images were performed as well.    Findings:  The lateral ventricles have a normal configuration.    There is no evidence of acute hemorrhage, mass or mass-effect in the posterior fossa or in the supratentorial region.    Evaluation of the osseous structures with the appropriate window appears unremarkable.    No abnormal areas of enhancement seen.    The dural venous sinuses demonstrate normal enhancement. The large intracerebral veins appear normal.    The visualized paranasal sinuses and mastoid and middle ear regions appear clear.    Impression:  Unremarkable pre and post contrast head CT.    --- End of Report ---            RISHI NGUYEN MD; Attending Radiologist  This document has been electronically signed. Sep 26 2021  7:23PM    < end of copied text >   ++++++++++++++++NOTE NOT COMPLETED++++++++++++++++++++++++++++++    Patient is a 31y old  Female who presents with a chief complaint of Severe headache (26 Sep 2021 21:39)      HPI:  Patient is 31 year old female from home, no significant Past history presented due to severe headache. For the last 2 years she has been getting intermittent headaches to the top of the head that are associated with difficulty with speech and swallowing. Feels like foods is getting stuck and when eats with water it is better. The episodes last for ~ 1 month and then resolve on their own. Associated with slight numbness of the tongue. she had a workup in [place] and was told there is an "issue with her brain but nothing can be done". Denies any vision changes, weakness of the eyelids, worsening of symptoms towards the end of the day, fever, chills, photo/phonophobia, focal weakness, numbness/tingling anywhere else, incontinence, gait problem or any other complaints. (26 Sep 2021 21:39)    -Kamlesh # [id number].     Neurological Review of Systems:  No difficulty with language.  No vision loss or double vision.  No dizziness, vertigo or new hearing loss.  (+) Difficulty with speech or swallowing.  No focal weakness.  No focal sensory changes.  No numbness or tingling in the bilateral lower extremities.  No difficulty with balance.  No difficulty with ambulation.        MEDICATIONS  (STANDING):  heparin   Injectable 5000 Unit(s) SubCutaneous every 8 hours  metoclopramide Injectable 10 milliGRAM(s) IV Push every 8 hours    MEDICATIONS  (PRN):  acetaminophen   Tablet .. 650 milliGRAM(s) Oral every 6 hours PRN Mild Pain (1 - 3)    Allergies    No Known Allergies    Intolerances      PAST MEDICAL & SURGICAL HISTORY:    FAMILY HISTORY:    SOCIAL HISTORY: non smoker    Review of Systems:  Constitutional: No generalized weakness. No fevers or chills                   Eyes, Ears, Mouth, Throat: No vision loss   Respiratory: No shortness of breath or cough                               Cardiovascular: No chest pain or palpitations  Gastrointestinal: (+) Nausea & vomiting                                      Genitourinary: No urinary incontinence or burning on urination  Musculoskeletal: No joint pain                                                         Dermatologic: No rash  Neurological: as per HPI                                                                      Psychiatric: No behavioral problems  Endocrine: No known hypoglycemia              Hematologic/Lymphatic: No easy bleeding    O:  Vital Signs Last 24 Hrs  T(C): 36.8 (27 Sep 2021 07:15), Max: 36.9 (26 Sep 2021 15:17)  T(F): 98.3 (27 Sep 2021 07:15), Max: 98.4 (26 Sep 2021 15:17)  HR: 74 (27 Sep 2021 07:15) (61 - 75)  BP: 107/68 (27 Sep 2021 07:15) (106/65 - 128/73)  RR: 18 (27 Sep 2021 07:15) (16 - 18)  SpO2: 99% (27 Sep 2021 07:15) (98% - 100%)    General Exam:   General appearance: No acute distress                 Cardiovascular: Pedal dorsalis pulses intact bilaterally    Mental Status: Oriented to self, date and place.  Attention intact.  No dysarthria, aphasia or neglect.  Knowledge intact.  Registration intact.  Short and long term memory grossly intact.      Cranial Nerves: CN I - not tested.  PERRL, EOMI, VFF, no nystagmus or diplopia.  No APD.  Fundi not visualized.  CN V1-3 intact to light touch.  No facial asymmetry.  Hearing intact to finger rub bilaterally.  Tongue, uvula and palate midline.  Sternocleidomastoid and Trapezius intact bilaterally.    Motor:   Tone: Normal                  Strength intact throughout  No pronator drift bilaterally                      No dysmetria on finger-nose-finger or heel-shin-heel  No truncal ataxia.  No resting, postural or action tremor.  No myoclonus.    Sensation: Intact to light touch    Deep Tendon Reflexes: 2+ bilateral biceps, triceps, brachioradialis, knee.  Mute ankles.    Toes     Gait: Normal and stable.  Romberg (-).    Other:     LABS:                        13.4   8.63  )-----------( 346      ( 26 Sep 2021 17:45 )             39.9     09-    142  |  110<H>  |  9   ----------------------------<  84  4.0   |  28  |  0.63    Ca    9.0      26 Sep 2021 17:45    TPro  8.1  /  Alb  4.3  /  TBili  0.7  /  DBili  x   /  AST  23  /  ALT  50  /  AlkPhos  61        Urinalysis Basic - ( 26 Sep 2021 17:46 )    Color: Yellow / Appearance: Clear / S.010 / pH: x  Gluc: x / Ketone: Negative  / Bili: Negative / Urobili: Negative   Blood: x / Protein: Negative / Nitrite: Negative   Leuk Esterase: Negative / RBC: 0-2 /HPF / WBC 0-2 /HPF   Sq Epi: x / Non Sq Epi: Few /HPF / Bacteria: Trace /HPF          RADIOLOGY & ADDITIONAL STUDIES:  < from: CT Head w/wo IV Cont (21 @ 19:07) >  EXAM:  CT BRAIN WAW IC                            PROCEDURE DATE:  2021          INTERPRETATION:  Clinical indications: Headache and vomiting    Technique:  Multiple axial sections were acquired from the base of the skull to the vertex beforeand after administration of approximately 90 cc of Omnipaque 350 IV. 10 cc of contrast was sclerotic. Coronal and sagittal reconstructed images were performed as well.    Findings:  The lateral ventricles have a normal configuration.    There is no evidence of acute hemorrhage, mass or mass-effect in the posterior fossa or in the supratentorial region.    Evaluation of the osseous structures with the appropriate window appears unremarkable.    No abnormal areas of enhancement seen.    The dural venous sinuses demonstrate normal enhancement. The large intracerebral veins appear normal.    The visualized paranasal sinuses and mastoid and middle ear regions appear clear.    Impression:  Unremarkable pre and post contrast head CT.    --- End of Report ---            RISHI NGUYEN MD; Attending Radiologist  This document has been electronically signed. Sep 26 2021  7:23PM    < end of copied text >   ++++++++++++++++NOTE NOT COMPLETED++++++++++++++++++++++++++++++    Patient is a 31y old  Female who presents with a chief complaint of Severe headache (26 Sep 2021 21:39)      HPI:  Patient is 31 year old female from home, no significant Past history presented due to severe headache. For the last 2 years she has been getting intermittent headaches to the top of the head that are associated with difficulty with speech and swallowing. Feels like foods is getting stuck and when eats with water it is better. The episodes last for ~ 1 month and then resolve on their own. Associated with slight numbness of the tongue. she had a workup in [place] and was told there is an "issue with her brain but nothing can be done". Denies any vision changes, weakness of the eyelids, worsening of symptoms towards the end of the day, fever, chills, photo/phonophobia, focal weakness, numbness/tingling anywhere else, incontinence, gait problem or any other complaints. (26 Sep 2021 21:39)    Reports HA started Sat however upon further questioning now states has been having these symptoms for the past 2yrs.  Reports have been seen in the country of [place] for these symptoms a few years ago.   Reports she's coming to the hospital now b/c she has insurance.  Reports for the past two years she's had intermittent HA bimonthly, described as one month of daily HA's then a month HA free.  Reports her HA restarted on Sat, .  Reports when her HA starts she has daily, "squeezing & poking in head", 10/10.  States, "HA comes when I struggle to speak, struggling to speak makes my head hurt."  Reports waking w/ HA's and spinning sensation while lying down.  HA's are unrelieved by Tylenol but relieved w/ Advil.  Denies spinning sensation while walking.  Reports occasional nausea and vomiting w/ these sxms.  Reports last emesis yesterday, Tues, .  Denies weakness, difficulty walking or falling while experiencing these sxms for the past 2yrs. Denies FH of migraines, cancer, or demyelinating disease.  Denies -Kamlesh # [id number].     Neurological Review of Systems:  No difficulty with language.  No vision loss or double vision.  No dizziness, vertigo or new hearing loss.  (+) Difficulty with speech or swallowing.  No focal weakness.  No focal sensory changes.  No numbness or tingling in the bilateral lower extremities.  No difficulty with balance.  No difficulty with ambulation.        MEDICATIONS  (STANDING):  heparin   Injectable 5000 Unit(s) SubCutaneous every 8 hours  metoclopramide Injectable 10 milliGRAM(s) IV Push every 8 hours    MEDICATIONS  (PRN):  acetaminophen   Tablet .. 650 milliGRAM(s) Oral every 6 hours PRN Mild Pain (1 - 3)    Allergies    No Known Allergies    Intolerances      PAST MEDICAL & SURGICAL HISTORY:    FAMILY HISTORY:    SOCIAL HISTORY: non smoker    Review of Systems:  Constitutional: No generalized weakness. No fevers or chills                   Eyes, Ears, Mouth, Throat: No vision loss   Respiratory: No shortness of breath or cough                               Cardiovascular: No chest pain or palpitations  Gastrointestinal: (+) Nausea & vomiting                                      Genitourinary: No urinary incontinence or burning on urination  Musculoskeletal: No joint pain                                                         Dermatologic: No rash  Neurological: as per HPI                                                                      Psychiatric: No behavioral problems  Endocrine: No known hypoglycemia              Hematologic/Lymphatic: No easy bleeding    O:  Vital Signs Last 24 Hrs  T(C): 36.8 (27 Sep 2021 07:15), Max: 36.9 (26 Sep 2021 15:17)  T(F): 98.3 (27 Sep 2021 07:15), Max: 98.4 (26 Sep 2021 15:17)  HR: 74 (27 Sep 2021 07:15) (61 - 75)  BP: 107/68 (27 Sep 2021 07:15) (106/65 - 128/73)  RR: 18 (27 Sep 2021 07:15) (16 - 18)  SpO2: 99% (27 Sep 2021 07:15) (98% - 100%)    General Exam:   General appearance: No acute distress                 Cardiovascular: Pedal dorsalis pulses intact bilaterally    Mental Status: Oriented to self, date and place.  Attention intact.  No dysarthria, aphasia or neglect.  Knowledge intact.  Registration intact.  Short and long term memory grossly intact.      Cranial Nerves: CN I - not tested.  PERRL, EOMI, VFF, no nystagmus or diplopia.  No APD.  Fundi not visualized.  CN V1-3 intact to light touch.  No facial asymmetry.  Hearing intact to finger rub bilaterally.  Tongue, uvula and palate midline.  Sternocleidomastoid and Trapezius intact bilaterally.    Motor:   Tone: Normal                  Strength intact throughout  No pronator drift bilaterally                      No dysmetria on finger-nose-finger or heel-shin-heel  No truncal ataxia.  No resting, postural or action tremor.  No myoclonus.    Sensation: Intact to light touch    Deep Tendon Reflexes: 2+ bilateral biceps, triceps, brachioradialis, knee.  Mute ankles.    Toes     Gait: Normal and stable.  Romberg (-).    Other:     LABS:                        13.4   8.63  )-----------( 346      ( 26 Sep 2021 17:45 )             39.9         142  |  110<H>  |  9   ----------------------------<  84  4.0   |  28  |  0.63    Ca    9.0      26 Sep 2021 17:45    TPro  8.1  /  Alb  4.3  /  TBili  0.7  /  DBili  x   /  AST  23  /  ALT  50  /  AlkPhos  61        Urinalysis Basic - ( 26 Sep 2021 17:46 )    Color: Yellow / Appearance: Clear / S.010 / pH: x  Gluc: x / Ketone: Negative  / Bili: Negative / Urobili: Negative   Blood: x / Protein: Negative / Nitrite: Negative   Leuk Esterase: Negative / RBC: 0-2 /HPF / WBC 0-2 /HPF   Sq Epi: x / Non Sq Epi: Few /HPF / Bacteria: Trace /HPF          RADIOLOGY & ADDITIONAL STUDIES:  < from: CT Head w/wo IV Cont (21 @ 19:07) >  EXAM:  CT BRAIN [facility] IC                            PROCEDURE DATE:  2021          INTERPRETATION:  Clinical indications: Headache and vomiting    Technique:  Multiple axial sections were acquired from the base of the skull to the vertex beforeand after administration of approximately 90 cc of Omnipaque 350 IV. 10 cc of contrast was sclerotic. Coronal and sagittal reconstructed images were performed as well.    Findings:  The lateral ventricles have a normal configuration.    There is no evidence of acute hemorrhage, mass or mass-effect in the posterior fossa or in the supratentorial region.    Evaluation of the osseous structures with the appropriate window appears unremarkable.    No abnormal areas of enhancement seen.    The dural venous sinuses demonstrate normal enhancement. The large intracerebral veins appear normal.    The visualized paranasal sinuses and mastoid and middle ear regions appear clear.    Impression:  Unremarkable pre and post contrast head CT.    --- End of Report ---            RISHI NGUYEN MD; Attending Radiologist  This document has been electronically signed. Sep 26 2021  7:23PM    < end of copied text >   ++++++++++++++++NOTE NOT COMPLETED++++++++++++++++++++++++++++++    Patient is a 31y old  Female who presents with a chief complaint of Severe headache (26 Sep 2021 21:39)      HPI:  Patient is 31 year old female from home, no significant Past history presented due to severe headache. For the last 2 years she has been getting intermittent headaches to the top of the head that are associated with difficulty with speech and swallowing. Feels like foods is getting stuck and when eats with water it is better. The episodes last for ~ 1 month and then resolve on their own. Associated with slight numbness of the tongue. she had a workup in [place] and was told there is an "issue with her brain but nothing can be done". Denies any vision changes, weakness of the eyelids, worsening of symptoms towards the end of the day, fever, chills, photo/phonophobia, focal weakness, numbness/tingling anywhere else, incontinence, gait problem or any other complaints. (26 Sep 2021 21:39)    Reports HA started Sat, .  However, upon further questioning now states shes' been having these symptoms for the past 2yrs.  Reports having been seen in the country of [place] for these symptoms a few years ago.   Reports she's coming to the hospital now b/c she has insurance.  Reports for the past two years she's had intermittent HA bimonthly: described as one month of daily HA's then a month HA free.  Reports her HA restarted on Sat, .  Reports when her HA starts she has daily, "squeezing & poking in head", 10/10.  States, "HA comes when I struggle to speak, struggling to speak makes my head hurt."  Reports waking w/ HA's and spinning sensation while lying down.  HA's are unrelieved by Tylenol but relieved by Advil.  Denies spinning sensation while walking.  Reports occasional nausea and vomiting w/ these sxms.  Reports last emesis yesterday, Tues, .  Denies weakness, difficulty walking or falling while experiencing these sxms for the past 2yrs. Denies FH of migraines, vertigo, cancer, or demyelinating disease.  Denies -Kamlesh # [id number].     Neurological Review of Systems:  No difficulty with language.  No vision loss or double vision.  No dizziness, vertigo or new hearing loss.  (+) Difficulty with speech or swallowing.  No focal weakness.  No focal sensory changes.  No numbness or tingling in the bilateral lower extremities.  No difficulty with balance.  No difficulty with ambulation.        MEDICATIONS  (STANDING):  heparin   Injectable 5000 Unit(s) SubCutaneous every 8 hours  metoclopramide Injectable 10 milliGRAM(s) IV Push every 8 hours    MEDICATIONS  (PRN):  acetaminophen   Tablet .. 650 milliGRAM(s) Oral every 6 hours PRN Mild Pain (1 - 3)    Allergies    No Known Allergies    Intolerances      PAST MEDICAL & SURGICAL HISTORY:    FAMILY HISTORY:    SOCIAL HISTORY: non smoker    Review of Systems:  Constitutional: No generalized weakness. No fevers or chills                   Eyes, Ears, Mouth, Throat: No vision loss   Respiratory: No shortness of breath or cough                               Cardiovascular: No chest pain or palpitations  Gastrointestinal: (+) Nausea & vomiting                                      Genitourinary: No urinary incontinence or burning on urination  Musculoskeletal: No joint pain                                                         Dermatologic: No rash  Neurological: as per HPI                                                                      Psychiatric: No behavioral problems  Endocrine: No known hypoglycemia              Hematologic/Lymphatic: No easy bleeding    O:  Vital Signs Last 24 Hrs  T(C): 36.8 (27 Sep 2021 07:15), Max: 36.9 (26 Sep 2021 15:17)  T(F): 98.3 (27 Sep 2021 07:15), Max: 98.4 (26 Sep 2021 15:17)  HR: 74 (27 Sep 2021 07:15) (61 - 75)  BP: 107/68 (27 Sep 2021 07:15) (106/65 - 128/73)  RR: 18 (27 Sep 2021 07:15) (16 - 18)  SpO2: 99% (27 Sep 2021 07:15) (98% - 100%)    General Exam:   General appearance: No acute distress                 Cardiovascular: Pedal dorsalis pulses intact bilaterally    Mental Status: Oriented to self, date and place.  Attention intact.  (+) Dysarthria.  No aphasia or neglect.  Knowledge intact.  Registration intact.  Short and long term memory grossly intact.      Cranial Nerves: CN I - not tested.  PERRL, EOMI, VFF, no nystagmus or diplopia.  No APD.  Fundi not visualized.  CN V1-3 intact to light touch.  No facial asymmetry.  Hearing intact to finger rub bilaterally.  Tongue, uvula and palate midline.  Sternocleidomastoid and Trapezius intact bilaterally.    Motor:   Tone: Normal                  Strength intact throughout  No pronator drift bilaterally                      No dysmetria on finger-nose-finger or heel-shin-heel  No truncal ataxia.  No resting, postural or action tremor.  No myoclonus.    Sensation: Intact to light touch    Deep Tendon Reflexes: 2+ bilateral biceps, triceps, brachioradialis, knee.  Mute ankles.    Toes     Gait: Normal and stable.  Romberg (-).    Other:     LABS:                        13.4   8.63  )-----------( 346      ( 26 Sep 2021 17:45 )             39.9         142  |  110<H>  |  9   ----------------------------<  84  4.0   |  28  |  0.63    Ca    9.0      26 Sep 2021 17:45    TPro  8.1  /  Alb  4.3  /  TBili  0.7  /  DBili  x   /  AST  23  /  ALT  50  /  AlkPhos  61        Urinalysis Basic - ( 26 Sep 2021 17:46 )    Color: Yellow / Appearance: Clear / S.010 / pH: x  Gluc: x / Ketone: Negative  / Bili: Negative / Urobili: Negative   Blood: x / Protein: Negative / Nitrite: Negative   Leuk Esterase: Negative / RBC: 0-2 /HPF / WBC 0-2 /HPF   Sq Epi: x / Non Sq Epi: Few /HPF / Bacteria: Trace /HPF          RADIOLOGY & ADDITIONAL STUDIES:  < from: CT Head w/wo IV Cont (21 @ 19:07) >  EXAM:  CT BRAIN [facility]                            PROCEDURE DATE:  2021          INTERPRETATION:  Clinical indications: Headache and vomiting    Technique:  Multiple axial sections were acquired from the base of the skull to the vertex beforeand after administration of approximately 90 cc of Omnipaque 350 IV. 10 cc of contrast was sclerotic. Coronal and sagittal reconstructed images were performed as well.    Findings:  The lateral ventricles have a normal configuration.    There is no evidence of acute hemorrhage, mass or mass-effect in the posterior fossa or in the supratentorial region.    Evaluation of the osseous structures with the appropriate window appears unremarkable.    No abnormal areas of enhancement seen.    The dural venous sinuses demonstrate normal enhancement. The large intracerebral veins appear normal.    The visualized paranasal sinuses and mastoid and middle ear regions appear clear.    Impression:  Unremarkable pre and post contrast head CT.    --- End of Report ---            RISHI NGUYEN MD; Attending Radiologist  This document has been electronically signed. Sep 26 2021  7:23PM    < end of copied text >   Patient is a 31y old  Female who presents with a chief complaint of Severe headache (26 Sep 2021 21:39)      HPI:  Patient is 31 year old female from home, no significant Past history presented due to severe headache. For the last 2 years she has been getting intermittent headaches to the top of the head that are associated with difficulty with speech and swallowing. Feels like foods is getting stuck and when eats with water it is better. The episodes last for ~ 1 month and then resolve on their own. Associated with slight numbness of the tongue. she had a workup in [place] and was told there is an "issue with her brain but nothing can be done". Denies any vision changes, weakness of the eyelids, worsening of symptoms towards the end of the day, fever, chills, photo/phonophobia, focal weakness, numbness/tingling anywhere else, incontinence, gait problem or any other complaints. (26 Sep 2021 21:39)    Reports HA started Sat, .  However, upon further questioning now states shes' been having these symptoms for the past 2yrs.  Reports having been seen in the country of [place] for these symptoms a few years ago.   Reports she's coming to the hospital now b/c she has insurance.  Reports for the past two years she's had intermittent HA bimonthly: described as one month of daily HA's then a month HA free.  Reports her HA restarted on Sat, .  Reports when her HA starts she has daily, "squeezing & poking in head", 10/10.  States, "HA comes when I struggle to speak, struggling to speak makes my head hurt."  Reports waking w/ HA's and spinning sensation while lying down.  HA's are unrelieved by Tylenol but relieved by Advil.  Denies spinning sensation while walking.  Reports occasional nausea and vomiting w/ these sxms.  Reports last emesis yesterday, Sun, .  Denies weakness, difficulty walking or falling while experiencing these sxms for the past 2yrs. Denies FH of migraines, vertigo, cancer, or demyelinating disease.  Additionally, pt's claustrophobic reports inability to tolerate MR.  Denies -Kamlesh # [id number].     Neurological Review of Systems:  No difficulty with language.  No vision loss or double vision.  No dizziness, vertigo or new hearing loss.  (+) Difficulty with speech or swallowing.  No focal weakness.  No focal sensory changes.  No numbness or tingling in the bilateral lower extremities.  No difficulty with balance.  No difficulty with ambulation.        MEDICATIONS  (STANDING):  heparin   Injectable 5000 Unit(s) SubCutaneous every 8 hours  metoclopramide Injectable 10 milliGRAM(s) IV Push every 8 hours    MEDICATIONS  (PRN):  acetaminophen   Tablet .. 650 milliGRAM(s) Oral every 6 hours PRN Mild Pain (1 - 3)    Allergies    No Known Allergies    Intolerances      PAST MEDICAL & SURGICAL HISTORY:    FAMILY HISTORY:    SOCIAL HISTORY: non smoker    Review of Systems:  Constitutional: No generalized weakness. No fevers or chills                   Eyes, Ears, Mouth, Throat: No vision loss   Respiratory: No shortness of breath or cough                               Cardiovascular: No chest pain or palpitations  Gastrointestinal: (+) Nausea & vomiting                                      Genitourinary: No urinary incontinence or burning on urination  Musculoskeletal: No joint pain                                                         Dermatologic: No rash  Neurological: as per HPI                                                                      Psychiatric: No behavioral problems  Endocrine: No known hypoglycemia              Hematologic/Lymphatic: No easy bleeding    O:  Vital Signs Last 24 Hrs  T(C): 36.8 (27 Sep 2021 07:15), Max: 36.9 (26 Sep 2021 15:17)  T(F): 98.3 (27 Sep 2021 07:15), Max: 98.4 (26 Sep 2021 15:17)  HR: 74 (27 Sep 2021 07:15) (61 - 75)  BP: 107/68 (27 Sep 2021 07:15) (106/65 - 128/73)  RR: 18 (27 Sep 2021 07:15) (16 - 18)  SpO2: 99% (27 Sep 2021 07:15) (98% - 100%)    General Exam:   General appearance: No acute distress                 Cardiovascular: Pedal dorsalis pulses intact bilaterally    Mental Status: Oriented to self, date and place.  Attention intact.  (+) Dysarthria.  No aphasia or neglect.  Knowledge intact.  Registration intact.  Short and long term memory grossly intact.      Cranial Nerves: CN I - not tested.  PERRL, EOMI, VFF, no nystagmus or diplopia.  No APD.  Fundi not visualized.  CN V1-3 intact to light touch.  No facial asymmetry.  Hearing intact to finger rub bilaterally.  Tongue, uvula and palate midline.  Sternocleidomastoid and Trapezius intact bilaterally.    Motor:   Tone: Normal                  Strength intact throughout  No pronator drift bilaterally                      No dysmetria on finger-nose-finger or heel-shin-heel  No truncal ataxia.  No resting, postural or action tremor.  No myoclonus.    Sensation: Intact to light touch    Deep Tendon Reflexes: 2+ bilateral biceps, triceps, brachioradialis, knee.  Mute ankles.    Toes     Gait: Normal and stable.  Romberg (-).    Other:   NIHSS=1 ( Dysarthria-1)  MRS=1    LABS:                        13.4   8.63  )-----------( 346      ( 26 Sep 2021 17:45 )             39.9         142  |  110<H>  |  9   ----------------------------<  84  4.0   |  28  |  0.63    Ca    9.0      26 Sep 2021 17:45    TPro  8.1  /  Alb  4.3  /  TBili  0.7  /  DBili  x   /  AST  23  /  ALT  50  /  AlkPhos  61        Urinalysis Basic - ( 26 Sep 2021 17:46 )    Color: Yellow / Appearance: Clear / S.010 / pH: x  Gluc: x / Ketone: Negative  / Bili: Negative / Urobili: Negative   Blood: x / Protein: Negative / Nitrite: Negative   Leuk Esterase: Negative / RBC: 0-2 /HPF / WBC 0-2 /HPF   Sq Epi: x / Non Sq Epi: Few /HPF / Bacteria: Trace /HPF          RADIOLOGY & ADDITIONAL STUDIES:  < from: CT Head w/wo IV Cont (21 @ 19:07) >  EXAM:  CT BRAIN [facility]                            PROCEDURE DATE:  2021          INTERPRETATION:  Clinical indications: Headache and vomiting    Technique:  Multiple axial sections were acquired from the base of the skull to the vertex beforeand after administration of approximately 90 cc of Omnipaque 350 IV. 10 cc of contrast was sclerotic. Coronal and sagittal reconstructed images were performed as well.    Findings:  The lateral ventricles have a normal configuration.    There is no evidence of acute hemorrhage, mass or mass-effect in the posterior fossa or in the supratentorial region.    Evaluation of the osseous structures with the appropriate window appears unremarkable.    No abnormal areas of enhancement seen.    The dural venous sinuses demonstrate normal enhancement. The large intracerebral veins appear normal.    The visualized paranasal sinuses and mastoid and middle ear regions appear clear.    Impression:  Unremarkable pre and post contrast head CT.    --- End of Report ---            RISHI NGUYEN MD; Attending Radiologist  This document has been electronically signed. Sep 26 2021  7:23PM    < end of copied text >   Patient is a 31y old  Female who presents with a chief complaint of Severe headache (26 Sep 2021 21:39)      HPI:  Patient is 31 year old female from home, no significant Past history presented due to severe headache. For the last 2 years she has been getting intermittent headaches to the top of the head that are associated with difficulty with speech and swallowing. Feels like foods is getting stuck and when eats with water it is better. The episodes last for ~ 1 month and then resolve on their own. Associated with slight numbness of the tongue. she had a workup in [place] and was told there is an "issue with her brain but nothing can be done". Denies any vision changes, weakness of the eyelids, worsening of symptoms towards the end of the day, fever, chills, photo/phonophobia, focal weakness, numbness/tingling anywhere else, incontinence, gait problem or any other complaints. (26 Sep 2021 21:39)    Reports HA started Sat, .  However, upon further questioning stated shes' been having these symptoms for the past 2yrs.  Reports having been seen in the country of [place] for these symptoms a few years ago.   Reports she's coming to the hospital now b/c she has insurance.  Reports for the past two years she's had intermittent HA bimonthly: described as one month of daily HA's then a month HA free.  Reports her HA restarted on Sat, .  Reports when her HA starts she has daily, "squeezing & poking in head", 10/10.  States, "HA comes when I struggle to speak, struggling to speak makes my head hurt."  Reports waking w/ HA's and spinning sensation while lying down.  HA's are unrelieved by Tylenol but relieved by Advil.  Denies spinning sensation while walking.  Reports occasional nausea and vomiting w/ these sxms.  Reports last emesis yesterday, Sun, .  Denies weakness, difficulty walking or falling while experiencing these sxms for the past 2yrs.   Also, reports the sxms are the same and have not worsened in the past 2yrs.   Denies FH of migraines, vertigo, cancer, or demyelinating disease.  Additionally, pt's claustrophobic reports inability to tolerate   Denies -Kamlesh # [id number].     Neurological Review of Systems:  No difficulty with language.  No vision loss or double vision.  No dizziness, vertigo or new hearing loss.  (+) Difficulty with speech or swallowing.  No focal weakness.  No focal sensory changes.  No numbness or tingling in the bilateral lower extremities.  No difficulty with balance.  No difficulty with ambulation.        MEDICATIONS  (STANDING):  heparin   Injectable 5000 Unit(s) SubCutaneous every 8 hours  metoclopramide Injectable 10 milliGRAM(s) IV Push every 8 hours    MEDICATIONS  (PRN):  acetaminophen   Tablet .. 650 milliGRAM(s) Oral every 6 hours PRN Mild Pain (1 - 3)    Allergies    No Known Allergies    Intolerances      PAST MEDICAL & SURGICAL HISTORY:    FAMILY HISTORY:    SOCIAL HISTORY: non smoker    Review of Systems:  Constitutional: No generalized weakness. No fevers or chills                   Eyes, Ears, Mouth, Throat: No vision loss   Respiratory: No shortness of breath or cough                               Cardiovascular: No chest pain or palpitations  Gastrointestinal: (+) Nausea & vomiting                                      Genitourinary: No urinary incontinence or burning on urination  Musculoskeletal: No joint pain                                                         Dermatologic: No rash  Neurological: as per HPI                                                                      Psychiatric: No behavioral problems  Endocrine: No known hypoglycemia              Hematologic/Lymphatic: No easy bleeding    O:  Vital Signs Last 24 Hrs  T(C): 36.8 (27 Sep 2021 07:15), Max: 36.9 (26 Sep 2021 15:17)  T(F): 98.3 (27 Sep 2021 07:15), Max: 98.4 (26 Sep 2021 15:17)  HR: 74 (27 Sep 2021 07:15) (61 - 75)  BP: 107/68 (27 Sep 2021 07:15) (106/65 - 128/73)  RR: 18 (27 Sep 2021 07:15) (16 - 18)  SpO2: 99% (27 Sep 2021 07:15) (98% - 100%)    General Exam:   General appearance: No acute distress                 Cardiovascular: Pedal dorsalis pulses intact bilaterally    Mental Status: Oriented to self, date and place.  Attention intact.  (+) Dysarthria.  No aphasia or neglect.  Knowledge intact.  Registration intact.  Short and long term memory grossly intact.      Cranial Nerves: CN I - not tested.  PERRL, EOMI, VFF, no nystagmus or diplopia.  No APD.  Fundi not visualized.  CN V1-3 intact to light touch.  No facial asymmetry.  Hearing intact to finger rub bilaterally.  Tongue, uvula and palate midline.  Sternocleidomastoid and Trapezius intact bilaterally.    Motor:   Tone: Normal                  Strength intact throughout  No pronator drift bilaterally                      No dysmetria on finger-nose-finger or heel-shin-heel  No truncal ataxia.  No resting, postural or action tremor.  No myoclonus.    Sensation: Intact to light touch    Deep Tendon Reflexes: 2+ bilateral biceps, triceps, brachioradialis, knee.  Mute ankles.    Toes     Gait: Normal and stable.  Romberg (-).    Other:   NIHSS=1 ( Dysarthria-1)  MRS=1    LABS:                        13.4   8.63  )-----------( 346      ( 26 Sep 2021 17:45 )             39.9         142  |  110<H>  |  9   ----------------------------<  84  4.0   |  28  |  0.63    Ca    9.0      26 Sep 2021 17:45    TPro  8.1  /  Alb  4.3  /  TBili  0.7  /  DBili  x   /  AST  23  /  ALT  50  /  AlkPhos  61        Urinalysis Basic - ( 26 Sep 2021 17:46 )    Color: Yellow / Appearance: Clear / S.010 / pH: x  Gluc: x / Ketone: Negative  / Bili: Negative / Urobili: Negative   Blood: x / Protein: Negative / Nitrite: Negative   Leuk Esterase: Negative / RBC: 0-2 /HPF / WBC 0-2 /HPF   Sq Epi: x / Non Sq Epi: Few /HPF / Bacteria: Trace /HPF          RADIOLOGY & ADDITIONAL STUDIES:  < from: CT Head w/wo IV Cont (21 @ 19:07) >  EXAM:  CT BRAIN [facility]                            PROCEDURE DATE:  2021          INTERPRETATION:  Clinical indications: Headache and vomiting    Technique:  Multiple axial sections were acquired from the base of the skull to the vertex beforeand after administration of approximately 90 cc of Omnipaque 350 IV. 10 cc of contrast was sclerotic. Coronal and sagittal reconstructed images were performed as well.    Findings:  The lateral ventricles have a normal configuration.    There is no evidence of acute hemorrhage, mass or mass-effect in the posterior fossa or in the supratentorial region.    Evaluation of the osseous structures with the appropriate window appears unremarkable.    No abnormal areas of enhancement seen.    The dural venous sinuses demonstrate normal enhancement. The large intracerebral veins appear normal.    The visualized paranasal sinuses and mastoid and middle ear regions appear clear.    Impression:  Unremarkable pre and post contrast head CT.    --- End of Report ---            RISHI NGUYEN MD; Attending Radiologist  This document has been electronically signed. Sep 26 2021  7:23PM    < end of copied text >   Patient is a 31y old  Female who presents with a chief complaint of Severe headache (26 Sep 2021 21:39)      HPI:  Patient is 31 year old female from home, no significant Past history presented due to severe headache. For the last 2 years she has been getting intermittent headaches to the top of the head that are associated with difficulty with speech and swallowing. Feels like foods is getting stuck and when eats with water it is better. The episodes last for ~ 1 month and then resolve on their own. Associated with slight numbness of the tongue. she had a workup in [place] and was told there is an "issue with her brain but nothing can be done". Denies any vision changes, weakness of the eyelids, worsening of symptoms towards the end of the day, fever, chills, photo/phonophobia, focal weakness, numbness/tingling anywhere else, incontinence, gait problem or any other complaints. (26 Sep 2021 21:39)    Reports HA started Sat, .  However, upon further questioning stated shes' been having these symptoms for the past 2yrs.  Reports having been seen in the country of [place] for these symptoms a few years ago.   Reports she's coming to the hospital now b/c she has insurance.  Reports for the past two years she's had intermittent HA bimonthly: described as one month of daily HA's then a month HA free.  Reports her HA restarted on Sat, .  Reports when her HA starts she has daily, "squeezing & poking in head", 10/10.  States, "HA comes when I struggle to speak, struggling to speak makes my head hurt."  Reports waking w/ HA's and spinning sensation while lying down.  HA's are unrelieved by Tylenol but relieved by Advil.  Denies spinning sensation while walking.  Reports occasional nausea and vomiting w/ these sxms.  Reports last emesis yesterday, Sun, .  Denies weakness, difficulty walking or falling while experiencing these sxms for the past 2yrs.   Also, reports the sxms are the same and have not worsened in the past 2yrs.   Denies FH of migraines, vertigo, cancer, or demyelinating disease.  Additionally, pt's claustrophobic reports inability to tolerate MR.  -Kamlesh # [id number].     Neurological Review of Systems:  No difficulty with language.  No vision loss or double vision.  No dizziness, vertigo or new hearing loss.  (+) Difficulty with speech or swallowing.  No focal weakness.  No focal sensory changes.  No numbness or tingling in the bilateral lower extremities.  No difficulty with balance.  No difficulty with ambulation.        MEDICATIONS  (STANDING):  heparin   Injectable 5000 Unit(s) SubCutaneous every 8 hours  metoclopramide Injectable 10 milliGRAM(s) IV Push every 8 hours    MEDICATIONS  (PRN):  acetaminophen   Tablet .. 650 milliGRAM(s) Oral every 6 hours PRN Mild Pain (1 - 3)    Allergies    No Known Allergies    Intolerances      PAST MEDICAL & SURGICAL HISTORY:    FAMILY HISTORY:    SOCIAL HISTORY: non smoker    Review of Systems:  Constitutional: No generalized weakness. No fevers or chills                   Eyes, Ears, Mouth, Throat: No vision loss   Respiratory: No shortness of breath or cough                               Cardiovascular: No chest pain or palpitations  Gastrointestinal: (+) Nausea & vomiting                                      Genitourinary: No urinary incontinence or burning on urination  Musculoskeletal: No joint pain                                                         Dermatologic: No rash  Neurological: as per HPI                                                                      Psychiatric: No behavioral problems  Endocrine: No known hypoglycemia              Hematologic/Lymphatic: No easy bleeding    O:  Vital Signs Last 24 Hrs  T(C): 36.8 (27 Sep 2021 07:15), Max: 36.9 (26 Sep 2021 15:17)  T(F): 98.3 (27 Sep 2021 07:15), Max: 98.4 (26 Sep 2021 15:17)  HR: 74 (27 Sep 2021 07:15) (61 - 75)  BP: 107/68 (27 Sep 2021 07:15) (106/65 - 128/73)  RR: 18 (27 Sep 2021 07:15) (16 - 18)  SpO2: 99% (27 Sep 2021 07:15) (98% - 100%)    General Exam:   General appearance: No acute distress                 Cardiovascular: Pedal dorsalis pulses intact bilaterally    Mental Status: Oriented to self, date and place.  Attention intact.  (+) Dysarthria.  No aphasia or neglect.  Knowledge intact.  Registration intact.  Short and long term memory grossly intact.      Cranial Nerves: CN I - not tested.  PERRL, EOMI, VFF, no nystagmus or diplopia.  No APD.  Fundi not visualized.  CN V1-3 intact to light touch.  No facial asymmetry.  Hearing intact to finger rub bilaterally.  Tongue, uvula and palate midline.  Sternocleidomastoid and Trapezius intact bilaterally.    Motor:   Tone: Normal                  Strength intact throughout  No pronator drift bilaterally                      No dysmetria on finger-nose-finger or heel-shin-heel  No truncal ataxia.  No resting, postural or action tremor.  No myoclonus.    Sensation: Intact to light touch    Deep Tendon Reflexes: 2+ bilateral biceps, triceps, brachioradialis, knee.  Mute ankles.    Toes     Gait: Normal and stable.  Romberg (-).    Other:   NIHSS=1 ( Dysarthria-1)  MRS=1    LABS:                        13.4   8.63  )-----------( 346      ( 26 Sep 2021 17:45 )             39.9         142  |  110<H>  |  9   ----------------------------<  84  4.0   |  28  |  0.63    Ca    9.0      26 Sep 2021 17:45    TPro  8.1  /  Alb  4.3  /  TBili  0.7  /  DBili  x   /  AST  23  /  ALT  50  /  AlkPhos  61        Urinalysis Basic - ( 26 Sep 2021 17:46 )    Color: Yellow / Appearance: Clear / S.010 / pH: x  Gluc: x / Ketone: Negative  / Bili: Negative / Urobili: Negative   Blood: x / Protein: Negative / Nitrite: Negative   Leuk Esterase: Negative / RBC: 0-2 /HPF / WBC 0-2 /HPF   Sq Epi: x / Non Sq Epi: Few /HPF / Bacteria: Trace /HPF          RADIOLOGY & ADDITIONAL STUDIES:  < from: CT Head w/wo IV Cont (21 @ 19:07) >  EXAM:  CT BRAIN [facility]                            PROCEDURE DATE:  2021          INTERPRETATION:  Clinical indications: Headache and vomiting    Technique:  Multiple axial sections were acquired from the base of the skull to the vertex beforeand after administration of approximately 90 cc of Omnipaque 350 IV. 10 cc of contrast was sclerotic. Coronal and sagittal reconstructed images were performed as well.    Findings:  The lateral ventricles have a normal configuration.    There is no evidence of acute hemorrhage, mass or mass-effect in the posterior fossa or in the supratentorial region.    Evaluation of the osseous structures with the appropriate window appears unremarkable.    No abnormal areas of enhancement seen.    The dural venous sinuses demonstrate normal enhancement. The large intracerebral veins appear normal.    The visualized paranasal sinuses and mastoid and middle ear regions appear clear.    Impression:  Unremarkable pre and post contrast head CT.    --- End of Report ---            RISHI NGUYEN MD; Attending Radiologist  This document has been electronically signed. Sep 26 2021  7:23PM    < end of copied text >   Patient is a 31y old  Female who presents with a chief complaint of Severe headache (26 Sep 2021 21:39)      HPI:  Patient is 31 year old female from home, no significant Past history presented due to severe headache. For the last 2 years she has been getting intermittent headaches to the top of the head that are associated with difficulty with speech and swallowing. Feels like foods is getting stuck and when eats with water it is better. The episodes last for ~ 1 month and then resolve on their own. Associated with slight numbness of the tongue. she had a workup in [place] and was told there is an "issue with her brain but nothing can be done". Denies any vision changes, weakness of the eyelids, worsening of symptoms towards the end of the day, fever, chills, photo/phonophobia, focal weakness, numbness/tingling anywhere else, incontinence, gait problem or any other complaints. (26 Sep 2021 21:39)    Reports HA started Sat, .  However, upon further questioning stated shes' been having these symptoms for the past 2yrs.  Reports having been seen in the country of [place] for these symptoms a few years ago.   Reports she's coming to the hospital now b/c she has insurance.  Reports for the past two years she's had intermittent HA bimonthly: described as one month of daily HA's then a month HA free.  Reports her HA restarted on Sat, .  Reports when her HA starts she has daily, "squeezing & poking in head", 10/10.  States, "HA comes when I struggle to speak, struggling to speak makes my head hurt."  Reports waking w/ HA's and spinning sensation while lying down.  HA's are unrelieved by Tylenol but relieved by Advil.  Denies spinning sensation while walking.  Reports occasional nausea and vomiting w/ these sxms.  Reports last emesis yesterday, Sun, .  Denies weakness, difficulty walking or falling while experiencing these sxms for the past 2yrs.   Also, reports the sxms are the same and have not worsened in the past 2yrs.   Denies FH of migraines, vertigo, cancer, or demyelinating disease.  Additionally, pt's claustrophobic reports inability to tolerate MR.  -Kamlesh # [id number].     Neurological Review of Systems:  No difficulty with language.  No vision loss or double vision.  No dizziness, vertigo or new hearing loss.  (+) Difficulty with speech or swallowing.  No focal weakness.  No focal sensory changes.  No numbness or tingling in the bilateral lower extremities.  No difficulty with balance.  No difficulty with ambulation.        MEDICATIONS  (STANDING):  heparin   Injectable 5000 Unit(s) SubCutaneous every 8 hours  metoclopramide Injectable 10 milliGRAM(s) IV Push every 8 hours    MEDICATIONS  (PRN):  acetaminophen   Tablet .. 650 milliGRAM(s) Oral every 6 hours PRN Mild Pain (1 - 3)    Allergies    No Known Allergies    Intolerances      PAST MEDICAL & SURGICAL HISTORY:    FAMILY HISTORY:    SOCIAL HISTORY: non smoker    Review of Systems:  Constitutional: No generalized weakness. No fevers or chills                   Eyes, Ears, Mouth, Throat: No vision loss   Respiratory: No shortness of breath or cough                               Cardiovascular: No chest pain or palpitations  Gastrointestinal: (+) Nausea & vomiting                                      Genitourinary: No urinary incontinence or burning on urination  Musculoskeletal: No joint pain                                                         Dermatologic: No rash  Neurological: as per HPI                                                                      Psychiatric: No behavioral problems  Endocrine: No known hypoglycemia              Hematologic/Lymphatic: No easy bleeding    O:  Vital Signs Last 24 Hrs  T(C): 36.8 (27 Sep 2021 07:15), Max: 36.9 (26 Sep 2021 15:17)  T(F): 98.3 (27 Sep 2021 07:15), Max: 98.4 (26 Sep 2021 15:17)  HR: 74 (27 Sep 2021 07:15) (61 - 75)  BP: 107/68 (27 Sep 2021 07:15) (106/65 - 128/73)  RR: 18 (27 Sep 2021 07:15) (16 - 18)  SpO2: 99% (27 Sep 2021 07:15) (98% - 100%)    General Exam:   General appearance: No acute distress                 Cardiovascular: Pedal dorsalis pulses intact bilaterally    Mental Status: Oriented to self, date and place.  Attention intact.  (+) Dysarthria.  No aphasia or neglect.  Knowledge intact.  Registration intact.  Short and long term memory grossly intact.      Cranial Nerves: CN I - not tested.  PERRL, EOMI, VFF, no nystagmus or diplopia.  No APD.  Fundi not visualized.  CN V1-3 intact to light touch.  No facial asymmetry.  Hearing intact to finger rub bilaterally.  Tongue, uvula and palate midline.  Sternocleidomastoid and Trapezius intact bilaterally.    Motor:   Tone: Normal                  Strength intact throughout  No pronator drift bilaterally                      No dysmetria on finger-nose-finger or heel-shin-heel  No truncal ataxia.  No resting, postural or action tremor.  No myoclonus.    Sensation: Intact to light touch    Deep Tendon Reflexes: 2+ bilateral biceps, triceps, brachioradialis, knee.  Mute ankles.    Toes     Gait: Normal and stable.  Romberg (-).    Other:   NIHSS=1 ( Dysarthria-1)  MRS=1    LABS:                        13.4   8.63  )-----------( 346      ( 26 Sep 2021 17:45 )             39.9         142  |  110<H>  |  9   ----------------------------<  84  4.0   |  28  |  0.63    Ca    9.0      26 Sep 2021 17:45    TPro  8.1  /  Alb  4.3  /  TBili  0.7  /  DBili  x   /  AST  23  /  ALT  50  /  AlkPhos  61        Urinalysis Basic - ( 26 Sep 2021 17:46 )    Color: Yellow / Appearance: Clear / S.010 / pH: x  Gluc: x / Ketone: Negative  / Bili: Negative / Urobili: Negative   Blood: x / Protein: Negative / Nitrite: Negative   Leuk Esterase: Negative / RBC: 0-2 /HPF / WBC 0-2 /HPF   Sq Epi: x / Non Sq Epi: Few /HPF / Bacteria: Trace /HPF          RADIOLOGY & ADDITIONAL STUDIES:  < from: CT Head w/wo IV Cont (21 @ 19:07) > (images reviewed)  EXAM:  CT BRAIN [facility]                            PROCEDURE DATE:  2021          INTERPRETATION:  Clinical indications: Headache and vomiting    Technique:  Multiple axial sections were acquired from the base of the skull to the vertex beforeand after administration of approximately 90 cc of Omnipaque 350 IV. 10 cc of contrast was sclerotic. Coronal and sagittal reconstructed images were performed as well.    Findings:  The lateral ventricles have a normal configuration.    There is no evidence of acute hemorrhage, mass or mass-effect in the posterior fossa or in the supratentorial region.    Evaluation of the osseous structures with the appropriate window appears unremarkable.    No abnormal areas of enhancement seen.    The dural venous sinuses demonstrate normal enhancement. The large intracerebral veins appear normal.    The visualized paranasal sinuses and mastoid and middle ear regions appear clear.    Impression:  Unremarkable pre and post contrast head CT.    --- End of Report ---            RISHI NGUYEN MD; Attending Radiologist  This document has been electronically signed. Sep 26 2021  7:23PM    < end of copied text >

## 2021-09-27 NOTE — SWALLOW BEDSIDE ASSESSMENT ADULT - SLP PERTINENT HISTORY OF CURRENT PROBLEM
31F from home. No significant PMHx. Pt reportedly presented due to severe headache. For the last 2 years she has been getting intermittent headaches to the top of the head that are associated with difficulty with speech and swallowing. Pt reportedly feels like foods is getting stuck and when eats with water it is better. The episodes last for ~ 1 month and then resolve on their own, associated with slight numbness of the tongue. She reportedly had a workup in [place] and was told there is an "issue with her brain but nothing can be done". Denies any vision changes, weakness of the eyelids, worsening of symptoms towards the end of the day, fever, chills, photo/phonophobia, focal weakness, numbness/tingling anywhere else, incontinence, gait problem or any other complaints.

## 2021-09-28 VITALS
OXYGEN SATURATION: 98 % | DIASTOLIC BLOOD PRESSURE: 66 MMHG | SYSTOLIC BLOOD PRESSURE: 104 MMHG | HEART RATE: 75 BPM | RESPIRATION RATE: 18 BRPM | TEMPERATURE: 98 F

## 2021-09-28 LAB
ALBUMIN SERPL ELPH-MCNC: 3.7 G/DL — SIGNIFICANT CHANGE UP (ref 3.5–5)
ALP SERPL-CCNC: 58 U/L — SIGNIFICANT CHANGE UP (ref 40–120)
ALT FLD-CCNC: 46 U/L DA — SIGNIFICANT CHANGE UP (ref 10–60)
ANION GAP SERPL CALC-SCNC: 5 MMOL/L — SIGNIFICANT CHANGE UP (ref 5–17)
AST SERPL-CCNC: 20 U/L — SIGNIFICANT CHANGE UP (ref 10–40)
BILIRUB SERPL-MCNC: 1 MG/DL — SIGNIFICANT CHANGE UP (ref 0.2–1.2)
BUN SERPL-MCNC: 8 MG/DL — SIGNIFICANT CHANGE UP (ref 7–18)
CALCIUM SERPL-MCNC: 9.1 MG/DL — SIGNIFICANT CHANGE UP (ref 8.4–10.5)
CHLORIDE SERPL-SCNC: 110 MMOL/L — HIGH (ref 96–108)
CO2 SERPL-SCNC: 28 MMOL/L — SIGNIFICANT CHANGE UP (ref 22–31)
CREAT SERPL-MCNC: 0.66 MG/DL — SIGNIFICANT CHANGE UP (ref 0.5–1.3)
GLUCOSE SERPL-MCNC: 98 MG/DL — SIGNIFICANT CHANGE UP (ref 70–99)
HCT VFR BLD CALC: 38.4 % — SIGNIFICANT CHANGE UP (ref 34.5–45)
HGB BLD-MCNC: 13 G/DL — SIGNIFICANT CHANGE UP (ref 11.5–15.5)
MAGNESIUM SERPL-MCNC: 2.2 MG/DL — SIGNIFICANT CHANGE UP (ref 1.6–2.6)
MCHC RBC-ENTMCNC: 30.9 PG — SIGNIFICANT CHANGE UP (ref 27–34)
MCHC RBC-ENTMCNC: 33.9 GM/DL — SIGNIFICANT CHANGE UP (ref 32–36)
MCV RBC AUTO: 91.2 FL — SIGNIFICANT CHANGE UP (ref 80–100)
NRBC # BLD: 0 /100 WBCS — SIGNIFICANT CHANGE UP (ref 0–0)
PHOSPHATE SERPL-MCNC: 3.3 MG/DL — SIGNIFICANT CHANGE UP (ref 2.5–4.5)
PLATELET # BLD AUTO: 333 K/UL — SIGNIFICANT CHANGE UP (ref 150–400)
POTASSIUM SERPL-MCNC: 4.3 MMOL/L — SIGNIFICANT CHANGE UP (ref 3.5–5.3)
POTASSIUM SERPL-SCNC: 4.3 MMOL/L — SIGNIFICANT CHANGE UP (ref 3.5–5.3)
PROT SERPL-MCNC: 7.6 G/DL — SIGNIFICANT CHANGE UP (ref 6–8.3)
RBC # BLD: 4.21 M/UL — SIGNIFICANT CHANGE UP (ref 3.8–5.2)
RBC # FLD: 12.6 % — SIGNIFICANT CHANGE UP (ref 10.3–14.5)
SODIUM SERPL-SCNC: 143 MMOL/L — SIGNIFICANT CHANGE UP (ref 135–145)
WBC # BLD: 6.57 K/UL — SIGNIFICANT CHANGE UP (ref 3.8–10.5)
WBC # FLD AUTO: 6.57 K/UL — SIGNIFICANT CHANGE UP (ref 3.8–10.5)

## 2021-09-28 PROCEDURE — 85027 COMPLETE CBC AUTOMATED: CPT

## 2021-09-28 PROCEDURE — 96374 THER/PROPH/DIAG INJ IV PUSH: CPT

## 2021-09-28 PROCEDURE — 70470 CT HEAD/BRAIN W/O & W/DYE: CPT | Mod: MA

## 2021-09-28 PROCEDURE — 95957 EEG DIGITAL ANALYSIS: CPT

## 2021-09-28 PROCEDURE — 95819 EEG AWAKE AND ASLEEP: CPT

## 2021-09-28 PROCEDURE — 99285 EMERGENCY DEPT VISIT HI MDM: CPT | Mod: 25

## 2021-09-28 PROCEDURE — 85025 COMPLETE CBC W/AUTO DIFF WBC: CPT

## 2021-09-28 PROCEDURE — 80053 COMPREHEN METABOLIC PANEL: CPT

## 2021-09-28 PROCEDURE — 83735 ASSAY OF MAGNESIUM: CPT

## 2021-09-28 PROCEDURE — 86038 ANTINUCLEAR ANTIBODIES: CPT

## 2021-09-28 PROCEDURE — 92610 EVALUATE SWALLOWING FUNCTION: CPT

## 2021-09-28 PROCEDURE — 86769 SARS-COV-2 COVID-19 ANTIBODY: CPT

## 2021-09-28 PROCEDURE — 99239 HOSP IP/OBS DSCHRG MGMT >30: CPT | Mod: GC

## 2021-09-28 PROCEDURE — 81001 URINALYSIS AUTO W/SCOPE: CPT

## 2021-09-28 PROCEDURE — 85652 RBC SED RATE AUTOMATED: CPT

## 2021-09-28 PROCEDURE — 71045 X-RAY EXAM CHEST 1 VIEW: CPT

## 2021-09-28 PROCEDURE — 86255 FLUORESCENT ANTIBODY SCREEN: CPT

## 2021-09-28 PROCEDURE — 82550 ASSAY OF CK (CPK): CPT

## 2021-09-28 PROCEDURE — 36415 COLL VENOUS BLD VENIPUNCTURE: CPT

## 2021-09-28 PROCEDURE — 93306 TTE W/DOPPLER COMPLETE: CPT

## 2021-09-28 PROCEDURE — 95819 EEG AWAKE AND ASLEEP: CPT | Mod: 26

## 2021-09-28 PROCEDURE — 81025 URINE PREGNANCY TEST: CPT

## 2021-09-28 PROCEDURE — 87635 SARS-COV-2 COVID-19 AMP PRB: CPT

## 2021-09-28 PROCEDURE — 84100 ASSAY OF PHOSPHORUS: CPT

## 2021-09-28 PROCEDURE — 86041 ACETYLCHOLN RCPTR BNDNG ANTB: CPT

## 2021-09-28 RX ADMIN — Medication 650 MILLIGRAM(S): at 13:52

## 2021-09-28 RX ADMIN — HEPARIN SODIUM 5000 UNIT(S): 5000 INJECTION INTRAVENOUS; SUBCUTANEOUS at 06:06

## 2021-09-28 RX ADMIN — Medication 650 MILLIGRAM(S): at 14:22

## 2021-09-28 RX ADMIN — HEPARIN SODIUM 5000 UNIT(S): 5000 INJECTION INTRAVENOUS; SUBCUTANEOUS at 13:55

## 2021-09-28 RX ADMIN — Medication 10 MILLIGRAM(S): at 06:07

## 2021-09-28 NOTE — EEG REPORT - NS EEG TEXT BOX
REPORT OF ROUTINE EEG WITH VIDEO  [facility]: [address] [place] [address], Phone: [phone] [facility]: [address] [place] [address], [place] [address], Phone: [phone] Office: [address] [place] [address], [place] [address], [place] [address], Phone: [phone]  Patient Name: SERAFIN MORRISON   Age: 31 year : 1990 MRN #: -, Location: - Referring Physician: -  EEG #: [id number] Study Date: 2021   Start Time: 10:01:06 AM    Study Duration: 22.9 		  Technical Information:					 On Instrument: - Placement and Labeling of Electrodes: The EEG was performed utilizing 20 channels referential EEG connections (coronal over temporal over parasagittal montage) using all standard 10-20 electrode placements with EKG.  Recording was at a sampling rate of 256 samples per second per channel.  Time synchronized digital video recording was done simultaneously with EEG recording.  A low light infrared camera was used for low light recording.  Willi and seizure detection algorithms were utilized. CSA Technical Component: Quantitative EEG analysis using a separate Compressed Spectral Array (CSA) software package was conducted in real-time and run at bedside after set up by the technician, digitally displaying the power of electrographic frequencies included in the 1-30Hz band using a graded color map.  This data was reviewed and interpreted independently, and is reported in a separate section below.  History:  Routine study. Performed bedside Patient awake  HV not performed and photic performed 30 Y/O female presents with: Dysphagia Severe headache  Medication No Data.  Study Interpretation:  FINDINGS:  The background was continuous, spontaneously variable and reactive.  During wakefulness, the posteriorly dominant rhythm consisted of symmetric, well modulated 10.5-11 Hz activity, with an amplitude to 30 uV, that attenuated to eye opening.  Low amplitude central beta was noted in wakefulness.  Background Slowing: Generalized slowing: none was present. Focal slowing: none was present.  Sleep Background: Drowsiness was characterized by fragmentation, attenuation, and slowing of the background activity.   Stage II sleep transients were not recorded.  Non-epileptiform activity: None.  Epileptiform Activity:  No epileptiform discharges were present.  Events: No clinical events were recorded. No seizures were recorded.  Activation Procedures:  -Hyperventilation was not performed.   -Photic driving response was noted intermittently.  Artifacts: Intermittent myogenic and movement artifacts were noted.  ECG: The heart rate on single channel ECG was predominantly between 70-80 BPM.  EEG Classification / Summary:  Normal EEG in the awake/drowsy states.  Clinical Impression:  Normal study. No epileptiform pattern or seizure seen.  Preliminary Fellow Report, final report pending attending review  Jayson Liz MD Epilepsy Fellow  Reading Room: [phone] On Call Service After Hours: [phone]    REPORT OF ROUTINE EEG WITH VIDEO  [facility]: [address] [place] [address], Phone: [phone] [facility]: [address] [place] [address], [place] [address], Phone: [phone] Office: [address] [place] [address], [place] [address], [place] [address], Phone: [phone]  Patient Name: SERAFIN MORRISON   Age: 31 year : 1990 MRN #: -, Location: - Referring Physician: -  EEG #: [id number] Study Date: 2021   Start Time: 10:01:06 AM    Study Duration: 22.9 		  Technical Information:					 On Instrument: - Placement and Labeling of Electrodes: The EEG was performed utilizing 20 channels referential EEG connections (coronal over temporal over parasagittal montage) using all standard 10-20 electrode placements with EKG.  Recording was at a sampling rate of 256 samples per second per channel.  Time synchronized digital video recording was done simultaneously with EEG recording.  A low light infrared camera was used for low light recording.  Willi and seizure detection algorithms were utilized. CSA Technical Component: Quantitative EEG analysis using a separate Compressed Spectral Array (CSA) software package was conducted in real-time and run at bedside after set up by the technician, digitally displaying the power of electrographic frequencies included in the 1-30Hz band using a graded color map.  This data was reviewed and interpreted independently, and is reported in a separate section below.  History:  Routine study. Performed bedside Patient awake  HV not performed and photic performed 32 Y/O female presents with: Dysphagia Severe headache  Medication No Data.  Study Interpretation:  FINDINGS:  The background was continuous, spontaneously variable and reactive.  During wakefulness, the posteriorly dominant rhythm consisted of symmetric, well modulated 10.5-11 Hz activity, with an amplitude to 30 uV, that attenuated to eye opening.  Low amplitude central beta was noted in wakefulness.  Background Slowing: Generalized slowing: none was present. Focal slowing: none was present.  Sleep Background: Drowsiness was characterized by fragmentation, attenuation, and slowing of the background activity.   Stage II sleep transients were not recorded.  Non-epileptiform activity: None.  Epileptiform Activity:  No epileptiform discharges were present.  Events: No clinical events were recorded. No seizures were recorded.  Activation Procedures:  -Hyperventilation was not performed.   -Photic driving response was noted intermittently.  Artifacts: Intermittent myogenic and movement artifacts were noted.  ECG: The heart rate on single channel ECG was predominantly between 70-80 BPM.  EEG Classification / Summary:  Normal EEG in the awake/drowsy states.  Clinical Impression:  Normal study. No epileptiform pattern or seizure seen.  Jayson Liz MD Epilepsy Fellow  Cheko Burr MD EEG/Epilepsy Attending  Reading Room: [phone] On Call Service After Hours: [phone]

## 2021-09-28 NOTE — DISCHARGE NOTE PROVIDER - NSDCCPCAREPLAN_GEN_ALL_CORE_FT
PRINCIPAL DISCHARGE DIAGNOSIS  Diagnosis: Severe headache  Assessment and Plan of Treatment: CT scan of head was unreamarkable.  You were seen  by neurology and EEG was performed, EEG was also unremarkable  since  you have claustophobia, we recommend you to have open brain MRI as out-patient   Follow up with PMD at [facility] as  informed you      SECONDARY DISCHARGE DIAGNOSES  Diagnosis: Dysphagia  Assessment and Plan of Treatment: resolved,  Seen by speech and swallow therapist, you can have regular consistency food with thin liquids   swallowing is also difficult when talking, laughing or lyind down so keep elevated head while and after meals   If you have worsening of swallowing, coughing on food please address it to healthcare provider

## 2021-09-28 NOTE — DISCHARGE NOTE NURSING/CASE MANAGEMENT/SOCIAL WORK - PATIENT PORTAL LINK FT
You can access the FollowMyHealth Patient Portal offered by [facility] by registering at the following website: [email or web address]. By joining [employer]’s FollowMyHealth portal, you will also be able to view your health information using other applications (apps) compatible with our system.

## 2021-09-28 NOTE — DISCHARGE NOTE PROVIDER - HOSPITAL COURSE
Patient is 31 year old female from home, no significant Past history presented due to severe headache. For the last 2 years she has been getting intermittent headaches to the top of the head that are associated with difficulty with speech and swallowing.  Associated with slight numbness of the tongue. she had a workup in [place] and aware of brain issue but no intervention needed per previous provider.  Pt was admitted to medicine for severe headache which required further neurology w/u as in pt.  CTH, EEG and echo were normal, neurology recommends MRI of brain as outpt due to claustrophobia.  Normal findings from speech and swallow evaluation.  Discussed with attending and decision was made for discharge home. SW consulted as pt is undocumented and needed out-pt f/u.  Referral was made to Angel[facility].   Please refer to Pt's complete medical chart with documents for a full hospital course, for this is only a brief summary.

## 2021-09-28 NOTE — DISCHARGE NOTE NURSING/CASE MANAGEMENT/SOCIAL WORK - NSDCPEFALRISK_GEN_ALL_CORE
For information on Fall & injury Prevention, visit [email or web address]

## 2021-09-29 LAB
ANA PAT FLD IF-IMP: ABNORMAL
ANA TITR SER: ABNORMAL

## 2021-09-30 LAB
ACRM BINDING ANTIBODY: <0.03 NMOL/L — SIGNIFICANT CHANGE UP (ref 0–0.24)
DSDNA AB SER QL CLIF: NEGATIVE — SIGNIFICANT CHANGE UP

## 2024-04-08 ENCOUNTER — EMERGENCY (EMERGENCY)
Facility: HOSPITAL | Age: 34
LOS: 0 days | Discharge: HOME | End: 2024-04-08
Attending: STUDENT IN AN ORGANIZED HEALTH CARE EDUCATION/TRAINING PROGRAM
Payer: COMMERCIAL

## 2024-04-08 VITALS
SYSTOLIC BLOOD PRESSURE: 125 MMHG | RESPIRATION RATE: 19 BRPM | HEIGHT: 61.42 IN | WEIGHT: 123.46 LBS | TEMPERATURE: 98 F | DIASTOLIC BLOOD PRESSURE: 78 MMHG | HEART RATE: 77 BPM | OXYGEN SATURATION: 100 %

## 2024-04-08 PROCEDURE — 73030 X-RAY EXAM OF SHOULDER: CPT | Mod: 26,RT

## 2024-04-08 PROCEDURE — 73090 X-RAY EXAM OF FOREARM: CPT | Mod: 26,RT

## 2024-04-08 PROCEDURE — 71046 X-RAY EXAM CHEST 2 VIEWS: CPT | Mod: 26

## 2024-04-08 PROCEDURE — 99284 EMERGENCY DEPT VISIT MOD MDM: CPT

## 2024-04-08 RX ORDER — LIDOCAINE 4 G/100G
1 CREAM TOPICAL
Qty: 2 | Refills: 0
Start: 2024-04-08 | End: 2024-04-17

## 2024-04-08 RX ORDER — LIDOCAINE 4 G/100G
1 CREAM TOPICAL ONCE
Refills: 0 | Status: COMPLETED | OUTPATIENT
Start: 2024-04-08 | End: 2024-04-08

## 2024-04-08 RX ORDER — ACETAMINOPHEN 500 MG
650 TABLET ORAL ONCE
Refills: 0 | Status: COMPLETED | OUTPATIENT
Start: 2024-04-08 | End: 2024-04-08

## 2024-04-08 RX ORDER — CYCLOBENZAPRINE HYDROCHLORIDE 10 MG/1
1 TABLET, FILM COATED ORAL
Qty: 15 | Refills: 0
Start: 2024-04-08 | End: 2024-04-12

## 2024-04-08 RX ORDER — IBUPROFEN 200 MG
600 TABLET ORAL ONCE
Refills: 0 | Status: COMPLETED | OUTPATIENT
Start: 2024-04-08 | End: 2024-04-08

## 2024-04-08 RX ORDER — IBUPROFEN 200 MG
1 TABLET ORAL
Qty: 15 | Refills: 0
Start: 2024-04-08 | End: 2024-04-12

## 2024-04-08 RX ADMIN — Medication 600 MILLIGRAM(S): at 19:09

## 2024-04-08 RX ADMIN — Medication 650 MILLIGRAM(S): at 20:14

## 2024-04-08 RX ADMIN — Medication 650 MILLIGRAM(S): at 19:09

## 2024-04-08 RX ADMIN — LIDOCAINE 1 PATCH: 4 CREAM TOPICAL at 19:08

## 2024-04-08 RX ADMIN — Medication 600 MILLIGRAM(S): at 20:14

## 2024-04-08 NOTE — ED PROVIDER NOTE - PHYSICAL EXAMINATION
Gen: aox3, nad,   Head: NCAT  ENT: Airway patent, moist mucous membranes, nasal passageways clear, trachea midline   Cardiac: Normal rate, normal rhythm   Respiratory: Lungs CTA B/L  Gastrointestinal: Abdomen soft, nontender, nondistended, no rebound, no guarding  MSK: No gross abnormalities, FROM of all four extremities, no edema, no midline c-t-l spine tenderness, normal ROM neck, + ttp right anterior shoulder but normal ROM right shoulder jt, + ttp right distal clavicle and anterior chest wall, mild ttp right mid forearm, 2+ radial pulses, median/ulnar/radial nerve testing intact,   HEME: Extremities warm, pulses intact and symmetrical in b/l upper arms   Skin: No rashes, no lesions  Neuro: No gross neurologic deficits, no sensory deficits, strength equal in all four extremities, no gait abnormality
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
## 2024-04-08 NOTE — ED PROVIDER NOTE - CARE PROVIDER_API CALL
Willy Mcwilliams  Orthopaedic Surgery  [address] [facility], [address] [address]  Phone: [phone]  Fax: [phone]  Follow Up Time:

## 2024-04-08 NOTE — ED PROVIDER NOTE - PROGRESS NOTE DETAILS
Deal DO: results, outpt follow up, analgesia, return precautions discussed, all questions answered using  Yogi ID # [id number]

## 2024-04-08 NOTE — ED PROVIDER NOTE - PATIENT PORTAL LINK FT
You can access the FollowMyHealth Patient Portal offered by [facility] by registering at the following website: [email or web address]. By joining [email or web address]’s FollowMyHealth portal, you will also be able to view your health information using other applications (apps) compatible with our system.
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
## 2024-04-10 DIAGNOSIS — M25.511 PAIN IN RIGHT SHOULDER: ICD-10-CM

## 2024-04-10 DIAGNOSIS — R07.89 OTHER CHEST PAIN: ICD-10-CM
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
## 2024-04-10 DIAGNOSIS — M79.631 PAIN IN RIGHT FOREARM: ICD-10-CM

## 2024-04-10 DIAGNOSIS — R51.9 HEADACHE, UNSPECIFIED: ICD-10-CM
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
## 2025-01-01 ENCOUNTER — EMERGENCY (EMERGENCY)
Facility: HOSPITAL | Age: 35
LOS: 0 days | Discharge: HOME | End: 2025-01-02
Attending: EMERGENCY MEDICINE
Payer: MEDICAID

## 2025-01-01 VITALS
HEART RATE: 80 BPM | TEMPERATURE: 98 F | SYSTOLIC BLOOD PRESSURE: 110 MMHG | HEIGHT: 60 IN | WEIGHT: 123.46 LBS | RESPIRATION RATE: 18 BRPM | OXYGEN SATURATION: 98 % | DIASTOLIC BLOOD PRESSURE: 74 MMHG

## 2025-01-01 DIAGNOSIS — R19.7 DIARRHEA, UNSPECIFIED: ICD-10-CM

## 2025-01-01 DIAGNOSIS — R51.9 HEADACHE, UNSPECIFIED: ICD-10-CM

## 2025-01-01 DIAGNOSIS — R10.816 EPIGASTRIC ABDOMINAL TENDERNESS: ICD-10-CM

## 2025-01-01 LAB
ALBUMIN SERPL ELPH-MCNC: 4 G/DL — SIGNIFICANT CHANGE UP (ref 3.3–5)
ALP SERPL-CCNC: 63 U/L — SIGNIFICANT CHANGE UP (ref 40–120)
ALT FLD-CCNC: 89 U/L — HIGH (ref 12–78)
ANION GAP SERPL CALC-SCNC: 6 MMOL/L — SIGNIFICANT CHANGE UP (ref 5–17)
APPEARANCE UR: ABNORMAL
AST SERPL-CCNC: 47 U/L — HIGH (ref 15–37)
BACTERIA # UR AUTO: ABNORMAL /HPF
BASOPHILS # BLD AUTO: 0.03 K/UL — SIGNIFICANT CHANGE UP (ref 0–0.2)
BASOPHILS NFR BLD AUTO: 0.3 % — SIGNIFICANT CHANGE UP (ref 0–2)
BILIRUB SERPL-MCNC: 0.6 MG/DL — SIGNIFICANT CHANGE UP (ref 0.2–1.2)
BILIRUB UR-MCNC: NEGATIVE — SIGNIFICANT CHANGE UP
BLD GP AB SCN SERPL QL: SIGNIFICANT CHANGE UP
BUN SERPL-MCNC: 8 MG/DL — SIGNIFICANT CHANGE UP (ref 7–23)
CALCIUM SERPL-MCNC: 9.1 MG/DL — SIGNIFICANT CHANGE UP (ref 8.5–10.1)
CHLORIDE SERPL-SCNC: 106 MMOL/L — SIGNIFICANT CHANGE UP (ref 96–108)
CO2 SERPL-SCNC: 26 MMOL/L — SIGNIFICANT CHANGE UP (ref 22–31)
COLOR SPEC: YELLOW — SIGNIFICANT CHANGE UP
CREAT SERPL-MCNC: 0.63 MG/DL — SIGNIFICANT CHANGE UP (ref 0.5–1.3)
DIFF PNL FLD: NEGATIVE — SIGNIFICANT CHANGE UP
EGFR: 118 ML/MIN/1.73M2 — SIGNIFICANT CHANGE UP
EOSINOPHIL # BLD AUTO: 0.23 K/UL — SIGNIFICANT CHANGE UP (ref 0–0.5)
EOSINOPHIL NFR BLD AUTO: 2.5 % — SIGNIFICANT CHANGE UP (ref 0–6)
EPI CELLS # UR: PRESENT
FLUAV AG NPH QL: SIGNIFICANT CHANGE UP
FLUBV AG NPH QL: SIGNIFICANT CHANGE UP
GLUCOSE SERPL-MCNC: 87 MG/DL — SIGNIFICANT CHANGE UP (ref 70–99)
GLUCOSE UR QL: NEGATIVE MG/DL — SIGNIFICANT CHANGE UP
HCG SERPL-ACNC: <1 MIU/ML — SIGNIFICANT CHANGE UP
HCT VFR BLD CALC: 40.8 % — SIGNIFICANT CHANGE UP (ref 34.5–45)
HGB BLD-MCNC: 13.7 G/DL — SIGNIFICANT CHANGE UP (ref 11.5–15.5)
IMM GRANULOCYTES NFR BLD AUTO: 0.4 % — SIGNIFICANT CHANGE UP (ref 0–0.9)
KETONES UR-MCNC: NEGATIVE MG/DL — SIGNIFICANT CHANGE UP
LACTATE SERPL-SCNC: 0.5 MMOL/L — LOW (ref 0.7–2)
LEUKOCYTE ESTERASE UR-ACNC: NEGATIVE — SIGNIFICANT CHANGE UP
LIDOCAIN IGE QN: 60 U/L — SIGNIFICANT CHANGE UP (ref 13–75)
LYMPHOCYTES # BLD AUTO: 2.36 K/UL — SIGNIFICANT CHANGE UP (ref 1–3.3)
LYMPHOCYTES # BLD AUTO: 25.3 % — SIGNIFICANT CHANGE UP (ref 13–44)
MCHC RBC-ENTMCNC: 30 PG — SIGNIFICANT CHANGE UP (ref 27–34)
MCHC RBC-ENTMCNC: 33.6 G/DL — SIGNIFICANT CHANGE UP (ref 32–36)
MCV RBC AUTO: 89.3 FL — SIGNIFICANT CHANGE UP (ref 80–100)
MONOCYTES # BLD AUTO: 0.45 K/UL — SIGNIFICANT CHANGE UP (ref 0–0.9)
MONOCYTES NFR BLD AUTO: 4.8 % — SIGNIFICANT CHANGE UP (ref 2–14)
NEUTROPHILS # BLD AUTO: 6.2 K/UL — SIGNIFICANT CHANGE UP (ref 1.8–7.4)
NEUTROPHILS NFR BLD AUTO: 66.7 % — SIGNIFICANT CHANGE UP (ref 43–77)
NITRITE UR-MCNC: NEGATIVE — SIGNIFICANT CHANGE UP
NRBC # BLD: 0 /100 WBCS — SIGNIFICANT CHANGE UP (ref 0–0)
PH UR: 6 — SIGNIFICANT CHANGE UP (ref 5–8)
PLATELET # BLD AUTO: 345 K/UL — SIGNIFICANT CHANGE UP (ref 150–400)
POTASSIUM SERPL-MCNC: 4 MMOL/L — SIGNIFICANT CHANGE UP (ref 3.5–5.3)
POTASSIUM SERPL-SCNC: 4 MMOL/L — SIGNIFICANT CHANGE UP (ref 3.5–5.3)
PROT SERPL-MCNC: 8.2 GM/DL — SIGNIFICANT CHANGE UP (ref 6–8.3)
PROT UR-MCNC: NEGATIVE MG/DL — SIGNIFICANT CHANGE UP
RBC # BLD: 4.57 M/UL — SIGNIFICANT CHANGE UP (ref 3.8–5.2)
RBC # FLD: 12.8 % — SIGNIFICANT CHANGE UP (ref 10.3–14.5)
RBC CASTS # UR COMP ASSIST: 0 /HPF — SIGNIFICANT CHANGE UP (ref 0–4)
RSV RNA NPH QL NAA+NON-PROBE: SIGNIFICANT CHANGE UP
SARS-COV-2 RNA SPEC QL NAA+PROBE: SIGNIFICANT CHANGE UP
SODIUM SERPL-SCNC: 138 MMOL/L — SIGNIFICANT CHANGE UP (ref 135–145)
SP GR SPEC: 1.02 — SIGNIFICANT CHANGE UP (ref 1–1.03)
UROBILINOGEN FLD QL: 1 MG/DL — SIGNIFICANT CHANGE UP (ref 0.2–1)
WBC # BLD: 9.31 K/UL — SIGNIFICANT CHANGE UP (ref 3.8–10.5)
WBC # FLD AUTO: 9.31 K/UL — SIGNIFICANT CHANGE UP (ref 3.8–10.5)
WBC UR QL: 1 /HPF — SIGNIFICANT CHANGE UP (ref 0–5)

## 2025-01-01 PROCEDURE — 93010 ELECTROCARDIOGRAM REPORT: CPT

## 2025-01-01 PROCEDURE — 99285 EMERGENCY DEPT VISIT HI MDM: CPT

## 2025-01-01 RX ORDER — ONDANSETRON HYDROCHLORIDE 4 MG/1
4 TABLET, FILM COATED ORAL ONCE
Refills: 0 | Status: COMPLETED | OUTPATIENT
Start: 2025-01-01 | End: 2025-01-01

## 2025-01-01 RX ORDER — IOHEXOL 300 MG/ML
30 INJECTION, SOLUTION INTRAVENOUS ONCE
Refills: 0 | Status: COMPLETED | OUTPATIENT
Start: 2025-01-01 | End: 2025-01-01

## 2025-01-01 RX ORDER — SODIUM CHLORIDE 9 MG/ML
1000 INJECTION, SOLUTION INTRAMUSCULAR; INTRAVENOUS; SUBCUTANEOUS ONCE
Refills: 0 | Status: COMPLETED | OUTPATIENT
Start: 2025-01-01 | End: 2025-01-01

## 2025-01-01 RX ORDER — ACETAMINOPHEN 500MG 500 MG/1
975 TABLET, COATED ORAL ONCE
Refills: 0 | Status: COMPLETED | OUTPATIENT
Start: 2025-01-01 | End: 2025-01-01

## 2025-01-01 RX ORDER — FAMOTIDINE 20 MG/1
20 TABLET, FILM COATED ORAL ONCE
Refills: 0 | Status: COMPLETED | OUTPATIENT
Start: 2025-01-01 | End: 2025-01-01

## 2025-01-01 RX ADMIN — SODIUM CHLORIDE 1000 MILLILITER(S): 9 INJECTION, SOLUTION INTRAMUSCULAR; INTRAVENOUS; SUBCUTANEOUS at 22:57

## 2025-01-01 RX ADMIN — ACETAMINOPHEN 500MG 975 MILLIGRAM(S): 500 TABLET, COATED ORAL at 22:56

## 2025-01-01 RX ADMIN — FAMOTIDINE 20 MILLIGRAM(S): 20 TABLET, FILM COATED ORAL at 22:57

## 2025-01-01 RX ADMIN — ONDANSETRON HYDROCHLORIDE 4 MILLIGRAM(S): 4 TABLET, FILM COATED ORAL at 22:57

## 2025-01-01 RX ADMIN — IOHEXOL 30 MILLILITER(S): 300 INJECTION, SOLUTION INTRAVENOUS at 22:56

## 2025-01-01 NOTE — ED PROVIDER NOTE - CARE PROVIDER_API CALL
Toi Peoples  Gastroenterology  [address] [place] [address]  Phone: [phone]  Fax: [phone]  Follow Up Time: Urgent

## 2025-01-01 NOTE — ED PROVIDER NOTE - PATIENT PORTAL LINK FT
You can access the FollowMyHealth Patient Portal offered by [facility] by registering at the following website: [email or web address]. By joining [email or web address]’s FollowMyHealth portal, you will also be able to view your health information using other applications (apps) compatible with our system.

## 2025-01-01 NOTE — ED ADULT NURSE NOTE - NSFALLUNIVINTERV_ED_ALL_ED
Bed/Stretcher in lowest position, wheels locked, appropriate side rails in place/Call bell, personal items and telephone in reach/Instruct patient to call for assistance before getting out of bed/chair/stretcher/Non-slip footwear applied when patient is off stretcher/[place] to call system/Physically safe environment - no spills, clutter or unnecessary equipment/Purposeful proactive rounding/Room/bathroom lighting operational, light cord in reach

## 2025-01-01 NOTE — ED PROVIDER NOTE - PHYSICAL EXAMINATION
Vitals: WNL  Gen: AAOx3, NAD, sitting comfortably in stretcher  Head: ncat, perrla, eomi b/l  Neck: supple, no lymphadenopathy, no midline deviation  Heart: rrr, no m/r/g  Lungs: CTA b/l, no rales/ronchi/wheezes  Abd: soft, +epigastric tenderness, non-distended, no rebound or guarding  Ext: no clubbing/cyanosis/edema  Neuro: sensation and muscle strength intact b/l, steady gait

## 2025-01-01 NOTE — ED PROVIDER NOTE - PROGRESS NOTE DETAILS
Results reported to patient--grossly benign, labs and imaging unremarkable   Pt. reports feeling better after meds  pt. agrees to f/u with primary care outpt., gi referral given for urgent f/u   pt. understands to return to ED if symptoms worsen; will d/c with antbx and reglan given length of diarrhea to cover for infection

## 2025-01-01 NOTE — ED PROVIDER NOTE - OBJECTIVE STATEMENT
Pt. prefers that son translate [place] at bedside:   35 yo F with central abd pain, n/v/d, for 1 week, not getting better.  Pt. denies inciting event, no prior surgeries.  Pt. never had this before.  She denies urinary and pelvic complaints.  She also has mild frontal ha associated.    ROS: negative for fever, cough, chest pain, shortness of breath, rash, paresthesia, and weakness--all other systems reviewed are negative.   PMH: Denies; Meds: Denies; SH: Denies smoking/drinking/drug use

## 2025-01-01 NOTE — ED ADULT NURSE NOTE - OBJECTIVE STATEMENT
Pt states that she has been having epigastric abdominal pain, headaches, Nausea and vomiting since 12/24 Pt states that she has been having epigastric abdominal pain, headaches, Nausea and vomiting since 12/24. No fevers, chills, back pain, diarrhea, dysuria, weakness, dizziness, sore throat, myalgias, SOB, cough, congestion. No PMH. LMP 12/10. Son at the bedside translating.

## 2025-01-01 NOTE — ED PROVIDER NOTE - CLINICAL SUMMARY MEDICAL DECISION MAKING FREE TEXT BOX
33 yo F with n/v/d, concerning for infectious diarrhea, doubt uti, renal stone, pancreatitis, GERD/ulcers  -cbc, cmp, type and screen, flu/covid, hcg, lactate, lipase, CT ab/pel, EKG, IV hydration, pepcid/zofran for n/v, npo  -f/u results, reeval

## 2025-01-02 VITALS
OXYGEN SATURATION: 99 % | SYSTOLIC BLOOD PRESSURE: 116 MMHG | RESPIRATION RATE: 18 BRPM | DIASTOLIC BLOOD PRESSURE: 72 MMHG | HEART RATE: 78 BPM

## 2025-01-02 PROBLEM — Z78.9 OTHER SPECIFIED HEALTH STATUS: Chronic | Status: ACTIVE | Noted: 2024-04-08

## 2025-01-02 PROCEDURE — 74177 CT ABD & PELVIS W/CONTRAST: CPT | Mod: 26,MC

## 2025-01-02 RX ORDER — METOCLOPRAMIDE HYDROCHLORIDE 10 MG/1
1 TABLET ORAL
Qty: 20 | Refills: 0
Start: 2025-01-02 | End: 2025-01-06

## 2025-01-02 RX ORDER — METRONIDAZOLE 500 MG/1
1 TABLET ORAL
Qty: 14 | Refills: 0
Start: 2025-01-02 | End: 2025-01-08

## 2025-01-02 RX ORDER — CIPROFLOXACIN HCL 750 MG
1 TABLET ORAL
Qty: 14 | Refills: 0
Start: 2025-01-02 | End: 2025-01-08